# Patient Record
Sex: MALE | Race: BLACK OR AFRICAN AMERICAN | NOT HISPANIC OR LATINO | URBAN - NONMETROPOLITAN AREA
[De-identification: names, ages, dates, MRNs, and addresses within clinical notes are randomized per-mention and may not be internally consistent; named-entity substitution may affect disease eponyms.]

---

## 2020-09-23 ENCOUNTER — LAB OUTSIDE AN ENCOUNTER (OUTPATIENT)
Dept: URBAN - NONMETROPOLITAN AREA CLINIC 2 | Facility: CLINIC | Age: 50
End: 2020-09-23

## 2020-09-23 ENCOUNTER — OFFICE VISIT (OUTPATIENT)
Dept: URBAN - NONMETROPOLITAN AREA CLINIC 2 | Facility: CLINIC | Age: 50
End: 2020-09-23
Payer: MEDICARE

## 2020-09-23 DIAGNOSIS — R12 HEARTBURN: ICD-10-CM

## 2020-09-23 DIAGNOSIS — R68.81 EARLY SATIETY: ICD-10-CM

## 2020-09-23 DIAGNOSIS — R14.0 BLOATING: ICD-10-CM

## 2020-09-23 DIAGNOSIS — N18.6 END STAGE RENAL DISEASE: ICD-10-CM

## 2020-09-23 DIAGNOSIS — K59.01 CONSTIPATION BY DELAYED COLONIC TRANSIT: ICD-10-CM

## 2020-09-23 DIAGNOSIS — Z12.11 COLON CANCER SCREENING: ICD-10-CM

## 2020-09-23 PROCEDURE — 99203 OFFICE O/P NEW LOW 30 MIN: CPT | Performed by: NURSE PRACTITIONER

## 2020-09-23 PROCEDURE — G8427 DOCREV CUR MEDS BY ELIG CLIN: HCPCS | Performed by: NURSE PRACTITIONER

## 2020-09-23 PROCEDURE — G9902 PT SCRN TBCO AND ID AS USER: HCPCS | Performed by: NURSE PRACTITIONER

## 2020-09-23 PROCEDURE — G8420 CALC BMI NORM PARAMETERS: HCPCS | Performed by: NURSE PRACTITIONER

## 2020-09-23 RX ORDER — CALCIUM ACETATE 667 MG
2 TABLETS WITH MEALS TABLET ORAL THREE TIMES A DAY
Status: ACTIVE | COMMUNITY

## 2020-09-23 RX ORDER — ATORVASTATIN CALCIUM 20 MG/1
1 TABLET TABLET, FILM COATED ORAL ONCE A DAY
Status: ACTIVE | COMMUNITY

## 2020-09-23 RX ORDER — CALCITRIOL 0.25 UG/1
1 CAPSULE CAPSULE ORAL ONCE A DAY
Status: ACTIVE | COMMUNITY

## 2020-09-23 RX ORDER — INSULIN GLARGINE 100 [IU]/ML
AS DIRECTED INJECTION, SOLUTION SUBCUTANEOUS
Status: ACTIVE | COMMUNITY

## 2020-09-23 RX ORDER — FAMOTIDINE 20 MG/1
1 TABLET AT BEDTIME AS NEEDED TABLET, FILM COATED ORAL ONCE A DAY
Qty: 30 | OUTPATIENT
Start: 2020-09-23

## 2020-09-23 RX ORDER — INSULIN ASPART 100 [IU]/ML
AS DIRECTED INJECTION, SOLUTION INTRAVENOUS; SUBCUTANEOUS
Status: ACTIVE | COMMUNITY

## 2020-09-23 NOTE — HPI-TODAY'S VISIT:
Angel Wilburn is a 50-year-old male with end-stage renal disease on hemodialysis Tuesday, Thursday, Saturday follows with Dr. Sommers.  He presents today with complaints of epigastric bloating and heartburn symptoms.  He has early satiety.  He has persistent nausea and occasionally vomiting.  No changes in weight.  He is not on antacids.  He has never had EGD.  He does have uncontrolled diabetes on insulin.  No prior diagnosis of gastroparesis. He reports chronic constipation that is mild and his main symptom is incomplete evacuation.  Dr. Sommers recently asked him to start taking baking soda each morning and he thinks this is helping but he continues with intermittent problems.  No rectal bleeding.  He has never had colonoscopy.  He is following with [facility] and will be following up in a couple months to start his renal transplant evaluation.  TG

## 2020-09-28 ENCOUNTER — OFFICE VISIT (OUTPATIENT)
Dept: URBAN - METROPOLITAN AREA MEDICAL CENTER 1 | Facility: MEDICAL CENTER | Age: 50
End: 2020-09-28
Payer: MEDICARE

## 2020-09-28 DIAGNOSIS — K20.8 CORROSIVE ESOPHAGITIS: ICD-10-CM

## 2020-09-28 DIAGNOSIS — K29.30 CHRONIC SUPERFICIAL GASTRITIS: ICD-10-CM

## 2020-09-28 PROCEDURE — 43239 EGD BIOPSY SINGLE/MULTIPLE: CPT | Performed by: INTERNAL MEDICINE

## 2020-09-28 RX ORDER — INSULIN GLARGINE 100 [IU]/ML
AS DIRECTED INJECTION, SOLUTION SUBCUTANEOUS
Status: ACTIVE | COMMUNITY

## 2020-09-28 RX ORDER — CALCITRIOL 0.25 UG/1
1 CAPSULE CAPSULE ORAL ONCE A DAY
Status: ACTIVE | COMMUNITY

## 2020-09-28 RX ORDER — CALCIUM ACETATE 667 MG
2 TABLETS WITH MEALS TABLET ORAL THREE TIMES A DAY
Status: ACTIVE | COMMUNITY

## 2020-09-28 RX ORDER — ATORVASTATIN CALCIUM 20 MG/1
1 TABLET TABLET, FILM COATED ORAL ONCE A DAY
Status: ACTIVE | COMMUNITY

## 2020-09-28 RX ORDER — INSULIN ASPART 100 [IU]/ML
AS DIRECTED INJECTION, SOLUTION INTRAVENOUS; SUBCUTANEOUS
Status: ACTIVE | COMMUNITY

## 2020-09-28 RX ORDER — FAMOTIDINE 20 MG/1
1 TABLET AT BEDTIME AS NEEDED TABLET, FILM COATED ORAL ONCE A DAY
Qty: 30 | Status: ACTIVE | COMMUNITY
Start: 2020-09-23

## 2020-09-29 ENCOUNTER — TELEPHONE ENCOUNTER (OUTPATIENT)
Dept: URBAN - METROPOLITAN AREA CLINIC 92 | Facility: CLINIC | Age: 50
End: 2020-09-29

## 2020-09-29 ENCOUNTER — LAB OUTSIDE AN ENCOUNTER (OUTPATIENT)
Dept: URBAN - METROPOLITAN AREA CLINIC 92 | Facility: CLINIC | Age: 50
End: 2020-09-29

## 2020-10-19 ENCOUNTER — OFFICE VISIT (OUTPATIENT)
Dept: URBAN - NONMETROPOLITAN AREA CLINIC 2 | Facility: CLINIC | Age: 50
End: 2020-10-19
Payer: MEDICARE

## 2020-10-19 ENCOUNTER — LAB OUTSIDE AN ENCOUNTER (OUTPATIENT)
Dept: URBAN - NONMETROPOLITAN AREA CLINIC 2 | Facility: CLINIC | Age: 50
End: 2020-10-19

## 2020-10-19 DIAGNOSIS — R68.81 EARLY SATIETY: ICD-10-CM

## 2020-10-19 DIAGNOSIS — R14.0 BLOATING: ICD-10-CM

## 2020-10-19 DIAGNOSIS — Z12.11 COLON CANCER SCREENING: ICD-10-CM

## 2020-10-19 DIAGNOSIS — K59.01 CONSTIPATION BY DELAYED COLONIC TRANSIT: ICD-10-CM

## 2020-10-19 DIAGNOSIS — R12 HEARTBURN: ICD-10-CM

## 2020-10-19 DIAGNOSIS — N18.6 END STAGE RENAL DISEASE: ICD-10-CM

## 2020-10-19 PROCEDURE — G8484 FLU IMMUNIZE NO ADMIN: HCPCS | Performed by: NURSE PRACTITIONER

## 2020-10-19 PROCEDURE — G8427 DOCREV CUR MEDS BY ELIG CLIN: HCPCS | Performed by: NURSE PRACTITIONER

## 2020-10-19 PROCEDURE — G8417 CALC BMI ABV UP PARAM F/U: HCPCS | Performed by: NURSE PRACTITIONER

## 2020-10-19 PROCEDURE — G9902 PT SCRN TBCO AND ID AS USER: HCPCS | Performed by: NURSE PRACTITIONER

## 2020-10-19 PROCEDURE — 99214 OFFICE O/P EST MOD 30 MIN: CPT | Performed by: NURSE PRACTITIONER

## 2020-10-19 RX ORDER — INSULIN ASPART 100 [IU]/ML
AS DIRECTED INJECTION, SOLUTION INTRAVENOUS; SUBCUTANEOUS
Status: ACTIVE | COMMUNITY

## 2020-10-19 RX ORDER — CALCIUM ACETATE 667 MG
2 TABLETS WITH MEALS TABLET ORAL THREE TIMES A DAY
Status: ACTIVE | COMMUNITY

## 2020-10-19 RX ORDER — POLYETHYLENE GLYCOL 3350, SODIUM SULFATE ANHYDROUS, SODIUM BICARBONATE, SODIUM CHLORIDE, POTASSIUM CHLORIDE 236; 22.74; 6.74; 5.86; 2.97 G/4L; G/4L; G/4L; G/4L; G/4L
AS DIRECTED POWDER, FOR SOLUTION ORAL ONCE
Qty: 1 BOTTLE | Refills: 0 | OUTPATIENT
Start: 2020-10-19 | End: 2020-10-20

## 2020-10-19 RX ORDER — CALCITRIOL 0.25 UG/1
1 CAPSULE CAPSULE ORAL ONCE A DAY
Status: ACTIVE | COMMUNITY

## 2020-10-19 RX ORDER — ATORVASTATIN CALCIUM 20 MG/1
1 TABLET TABLET, FILM COATED ORAL ONCE A DAY
Status: ACTIVE | COMMUNITY

## 2020-10-19 RX ORDER — FAMOTIDINE 20 MG/1
1 TABLET AT BEDTIME AS NEEDED TABLET, FILM COATED ORAL ONCE A DAY
Qty: 30 | Status: ACTIVE | COMMUNITY
Start: 2020-09-23

## 2020-10-19 RX ORDER — INSULIN GLARGINE 100 [IU]/ML
AS DIRECTED INJECTION, SOLUTION SUBCUTANEOUS
Status: ACTIVE | COMMUNITY

## 2020-10-19 RX ORDER — FAMOTIDINE 20 MG/1
1 TABLET AT LUNCH AND 1 TABLET AT  BEDTIME TABLET, FILM COATED ORAL TWICE A DAY
Qty: 180 TABLET | Refills: 3 | OUTPATIENT

## 2020-10-19 NOTE — HPI-TODAY'S VISIT:
9/23/20 Angel Wilburn is a 50-year-old male with end-stage renal disease on hemodialysis Tuesday, Thursday, Saturday follows with Dr. Sommers.  He presents today with complaints of epigastric bloating and heartburn symptoms.  He has early satiety.  He has persistent nausea and occasionally vomiting.  No changes in weight.  He is not on antacids.  He has never had EGD.  He does have uncontrolled diabetes on insulin.  No prior diagnosis of gastroparesis. He reports chronic constipation that is mild and his main symptom is incomplete evacuation.  Dr. Sommers recently asked him to start taking baking soda each morning and he thinks this is helping but he continues with intermittent problems.  No rectal bleeding.  He has never had colonoscopy.  He is following with [facility] and will be following up in a couple months to start his renal transplant evaluation.  TG 10/19/20 Mr. Wilburn presents for follow-up after EGD.  He had his procedure 9/28/2020 at the hospital with normal mucosa of the esophagus, stomach, and duodenum path consistent with chronic gastritis and esophagitis, no H. pylori, EOE, or Goodwin's. He feels like his symptoms are better on the famotidine however he is taking it every 2 or 3 days and not nightly.  He continues with early satiety, bloating, generalized epigastric discomfort.  He has nausea after dialysis but no vomiting. He has never had colonoscopy.  No rectal bleeding or melena.  No change in bowel habits.  He does have anemia from chronic disease as he has end-stage renal failure on hemodialysis.  He is planning to start renal transplant evaluation within the next few months per his report.  TG

## 2020-10-26 ENCOUNTER — OFFICE VISIT (OUTPATIENT)
Dept: URBAN - METROPOLITAN AREA MEDICAL CENTER 1 | Facility: MEDICAL CENTER | Age: 50
End: 2020-10-26
Payer: MEDICARE

## 2020-10-26 DIAGNOSIS — K62.1 DYSPLASTIC POLYP OF RECTUM: ICD-10-CM

## 2020-10-26 DIAGNOSIS — Z12.11 COLON CANCER SCREENING: ICD-10-CM

## 2020-10-26 DIAGNOSIS — K63.5 BENIGN COLON POLYP: ICD-10-CM

## 2020-10-26 PROCEDURE — G9935 CANC NOT DETECTD DURING SRCN: HCPCS | Performed by: INTERNAL MEDICINE

## 2020-10-26 PROCEDURE — 45380 COLONOSCOPY AND BIOPSY: CPT | Performed by: INTERNAL MEDICINE

## 2020-11-16 ENCOUNTER — OFFICE VISIT (OUTPATIENT)
Dept: URBAN - NONMETROPOLITAN AREA CLINIC 2 | Facility: CLINIC | Age: 50
End: 2020-11-16
Payer: MEDICARE

## 2020-11-16 ENCOUNTER — LAB OUTSIDE AN ENCOUNTER (OUTPATIENT)
Dept: URBAN - NONMETROPOLITAN AREA CLINIC 2 | Facility: CLINIC | Age: 50
End: 2020-11-16

## 2020-11-16 DIAGNOSIS — N18.6 END STAGE RENAL DISEASE: ICD-10-CM

## 2020-11-16 DIAGNOSIS — R68.81 EARLY SATIETY: ICD-10-CM

## 2020-11-16 DIAGNOSIS — R12 HEARTBURN: ICD-10-CM

## 2020-11-16 DIAGNOSIS — R14.0 BLOATING: ICD-10-CM

## 2020-11-16 DIAGNOSIS — Z12.11 COLON CANCER SCREENING: ICD-10-CM

## 2020-11-16 DIAGNOSIS — K59.01 CONSTIPATION BY DELAYED COLONIC TRANSIT: ICD-10-CM

## 2020-11-16 PROCEDURE — 99213 OFFICE O/P EST LOW 20 MIN: CPT | Performed by: NURSE PRACTITIONER

## 2020-11-16 PROCEDURE — G8483 FLU IMM NO ADMIN DOC REA: HCPCS | Performed by: NURSE PRACTITIONER

## 2020-11-16 PROCEDURE — G9902 PT SCRN TBCO AND ID AS USER: HCPCS | Performed by: NURSE PRACTITIONER

## 2020-11-16 PROCEDURE — G8420 CALC BMI NORM PARAMETERS: HCPCS | Performed by: NURSE PRACTITIONER

## 2020-11-16 PROCEDURE — G8427 DOCREV CUR MEDS BY ELIG CLIN: HCPCS | Performed by: NURSE PRACTITIONER

## 2020-11-16 RX ORDER — CALCITRIOL 0.25 UG/1
1 CAPSULE CAPSULE ORAL ONCE A DAY
Status: ACTIVE | COMMUNITY

## 2020-11-16 RX ORDER — FAMOTIDINE 20 MG/1
1 TABLET AT LUNCH AND 1 TABLET AT  BEDTIME TABLET, FILM COATED ORAL TWICE A DAY
Qty: 180 TABLET | Refills: 3 | Status: ACTIVE | COMMUNITY

## 2020-11-16 RX ORDER — ATORVASTATIN CALCIUM 20 MG/1
1 TABLET TABLET, FILM COATED ORAL ONCE A DAY
Status: ACTIVE | COMMUNITY

## 2020-11-16 RX ORDER — INSULIN GLARGINE 100 [IU]/ML
AS DIRECTED INJECTION, SOLUTION SUBCUTANEOUS
Status: ACTIVE | COMMUNITY

## 2020-11-16 RX ORDER — CALCIUM ACETATE 667 MG
2 TABLETS WITH MEALS TABLET ORAL THREE TIMES A DAY
Status: ACTIVE | COMMUNITY

## 2020-11-16 RX ORDER — INSULIN ASPART 100 [IU]/ML
AS DIRECTED INJECTION, SOLUTION INTRAVENOUS; SUBCUTANEOUS
Status: ACTIVE | COMMUNITY

## 2020-11-16 RX ORDER — FAMOTIDINE 20 MG/1
1 TABLET AT LUNCH AND 1 TABLET AT  BEDTIME TABLET, FILM COATED ORAL TWICE A DAY
Qty: 180 TABLET | Refills: 3 | OUTPATIENT

## 2020-11-16 NOTE — PHYSICAL EXAM NECK/THYROID:
normal appearance , without tenderness upon palpation , no deformities , trachea midline , Thyroid normal size , no thyroid nodules , no masses , no JVD , thyroid nontender pt s/p ileostomy creation 12/10

## 2020-11-16 NOTE — HPI-TODAY'S VISIT:
9/23/20 Angel Wilburn is a 50-year-old male with end-stage renal disease on hemodialysis Tuesday, Thursday, Saturday follows with Dr. Sommers.  He presents today with complaints of epigastric bloating and heartburn symptoms.  He has early satiety.  He has persistent nausea and occasionally vomiting.  No changes in weight.  He is not on antacids.  He has never had EGD.  He does have uncontrolled diabetes on insulin.  No prior diagnosis of gastroparesis. He reports chronic constipation that is mild and his main symptom is incomplete evacuation.  Dr. Sommers recently asked him to start taking baking soda each morning and he thinks this is helping but he continues with intermittent problems.  No rectal bleeding.  He has never had colonoscopy.  He is following with [facility] and will be following up in a couple months to start his renal transplant evaluation.  TG 10/19/20 Mr. Wilburn presents for follow-up after EGD.  He had his procedure 9/28/2020 at the hospital with normal mucosa of the esophagus, stomach, and duodenum path consistent with chronic gastritis and esophagitis, no H. pylori, EOE, or Goodwin's. He feels like his symptoms are better on the famotidine however he is taking it every 2 or 3 days and not nightly.  He continues with early satiety, bloating, generalized epigastric discomfort.  He has nausea after dialysis but no vomiting. He has never had colonoscopy.  No rectal bleeding or melena.  No change in bowel habits.  He does have anemia from chronic disease as he has end-stage renal failure on hemodialysis.  He is planning to start renal transplant evaluation within the next few months per his report.  TG  11/16/20 Angel presents for follow up after colonoscopy 10/26/20 with a polyp in the rectum and simgoid, noted to have sigmoid spasm, mild diverticulosis of the ascenidng and sigmoid colon, normal TI. Path with HP polyps, to repeat in 10 years.  Bowel movements are about the same. He did not start the bowel regimen. He has incomplete evacuation, may be related to spasm. Go ahead with the bowel regimen.  His heartburn is better but he continues on the famotidine only as needed and is not taking it scheduled. He has nausea most mornings and vomits a few times week. No nausea/vomiting throughout the rest of the day and no issues after eating. His appetite and weight are stable. He did not do the GES. TG

## 2021-02-15 ENCOUNTER — OFFICE VISIT (OUTPATIENT)
Dept: URBAN - NONMETROPOLITAN AREA CLINIC 2 | Facility: CLINIC | Age: 51
End: 2021-02-15

## 2021-02-15 RX ORDER — ATORVASTATIN CALCIUM 20 MG/1
1 TABLET TABLET, FILM COATED ORAL ONCE A DAY
Status: ACTIVE | COMMUNITY

## 2021-02-15 RX ORDER — CALCITRIOL 0.25 UG/1
1 CAPSULE CAPSULE ORAL ONCE A DAY
Status: ACTIVE | COMMUNITY

## 2021-02-15 RX ORDER — INSULIN GLARGINE 100 [IU]/ML
AS DIRECTED INJECTION, SOLUTION SUBCUTANEOUS
Status: ACTIVE | COMMUNITY

## 2021-02-15 RX ORDER — CALCIUM ACETATE 667 MG
2 TABLETS WITH MEALS TABLET ORAL THREE TIMES A DAY
Status: ACTIVE | COMMUNITY

## 2021-02-15 RX ORDER — FAMOTIDINE 20 MG/1
1 TABLET AT LUNCH AND 1 TABLET AT  BEDTIME TABLET, FILM COATED ORAL TWICE A DAY
Qty: 180 TABLET | Refills: 3 | Status: ACTIVE | COMMUNITY

## 2021-02-15 RX ORDER — INSULIN ASPART 100 [IU]/ML
AS DIRECTED INJECTION, SOLUTION INTRAVENOUS; SUBCUTANEOUS
Status: ACTIVE | COMMUNITY

## 2021-03-01 ENCOUNTER — OFFICE VISIT (OUTPATIENT)
Dept: URBAN - NONMETROPOLITAN AREA CLINIC 2 | Facility: CLINIC | Age: 51
End: 2021-03-01
Payer: MEDICARE

## 2021-03-01 DIAGNOSIS — Z12.11 COLON CANCER SCREENING: ICD-10-CM

## 2021-03-01 DIAGNOSIS — N18.6 END STAGE RENAL DISEASE: ICD-10-CM

## 2021-03-01 DIAGNOSIS — R12 HEARTBURN: ICD-10-CM

## 2021-03-01 DIAGNOSIS — K31.84 GASTROPARESIS: ICD-10-CM

## 2021-03-01 DIAGNOSIS — K59.01 CONSTIPATION BY DELAYED COLONIC TRANSIT: ICD-10-CM

## 2021-03-01 PROCEDURE — 99213 OFFICE O/P EST LOW 20 MIN: CPT | Performed by: NURSE PRACTITIONER

## 2021-03-01 RX ORDER — INSULIN ASPART 100 [IU]/ML
AS DIRECTED INJECTION, SOLUTION INTRAVENOUS; SUBCUTANEOUS
Status: ACTIVE | COMMUNITY

## 2021-03-01 RX ORDER — FAMOTIDINE 20 MG/1
1 TABLET AT LUNCH AND 1 TABLET AT  BEDTIME TABLET, FILM COATED ORAL TWICE A DAY
Qty: 180 TABLET | Refills: 3 | Status: ACTIVE | COMMUNITY

## 2021-03-01 RX ORDER — ATORVASTATIN CALCIUM 20 MG/1
1 TABLET TABLET, FILM COATED ORAL ONCE A DAY
Status: ACTIVE | COMMUNITY

## 2021-03-01 RX ORDER — INSULIN GLARGINE 100 [IU]/ML
AS DIRECTED INJECTION, SOLUTION SUBCUTANEOUS
Status: ACTIVE | COMMUNITY

## 2021-03-01 RX ORDER — FAMOTIDINE 20 MG/1
1 TABLET AT LUNCH AND 1 TABLET AT  BEDTIME TABLET, FILM COATED ORAL TWICE A DAY
OUTPATIENT

## 2021-03-01 RX ORDER — CALCITRIOL 0.25 UG/1
1 CAPSULE CAPSULE ORAL ONCE A DAY
Status: ACTIVE | COMMUNITY

## 2021-03-01 RX ORDER — CALCIUM ACETATE 667 MG
2 TABLETS WITH MEALS TABLET ORAL THREE TIMES A DAY
Status: ACTIVE | COMMUNITY

## 2021-03-01 NOTE — HPI-TODAY'S VISIT:
9/23/20 Angel Wilburn is a 50-year-old male with end-stage renal disease on hemodialysis Tuesday, Thursday, Saturday follows with Dr. Sommers.  He presents today with complaints of epigastric bloating and heartburn symptoms.  He has early satiety.  He has persistent nausea and occasionally vomiting.  No changes in weight.  He is not on antacids.  He has never had EGD.  He does have uncontrolled diabetes on insulin.  No prior diagnosis of gastroparesis. He reports chronic constipation that is mild and his main symptom is incomplete evacuation.  Dr. Sommers recently asked him to start taking baking soda each morning and he thinks this is helping but he continues with intermittent problems.  No rectal bleeding.  He has never had colonoscopy.  He is following with [facility] and will be following up in a couple months to start his renal transplant evaluation.  TG 10/19/20 Mr. Wilburn presents for follow-up after EGD.  He had his procedure 9/28/2020 at the hospital with normal mucosa of the esophagus, stomach, and duodenum path consistent with chronic gastritis and esophagitis, no H. pylori, EOE, or Goodwin's. He feels like his symptoms are better on the famotidine however he is taking it every 2 or 3 days and not nightly.  He continues with early satiety, bloating, generalized epigastric discomfort.  He has nausea after dialysis but no vomiting. He has never had colonoscopy.  No rectal bleeding or melena.  No change in bowel habits.  He does have anemia from chronic disease as he has end-stage renal failure on hemodialysis.  He is planning to start renal transplant evaluation within the next few months per his report.  TG  11/16/20 Angel presents for follow up after colonoscopy 10/26/20 with a polyp in the rectum and simgoid, noted to have sigmoid spasm, mild diverticulosis of the ascenidng and sigmoid colon, normal TI. Path with HP polyps, to repeat in 10 years.  Bowel movements are about the same. He did not start the bowel regimen. He has incomplete evacuation, may be related to spasm. Go ahead with the bowel regimen.  His heartburn is better but he continues on the famotidine only as needed and is not taking it scheduled. He has nausea most mornings and vomits a few times week. No nausea/vomiting throughout the rest of the day and no issues after eating. His appetite and weight are stable. He did not do the GES. TG  3/1/21 Angel presents for follow up for HB and early satiety. His GES showed mild delay of 57 minutes. He reports his heartburn is better since he started taking the famotidine daily. He is not taking it twice daily. He does have some  breakthrough symptos occasionally but for the  most part its well controlled. His early satiety seems to be better since starting the famotidine as well. Discussed this is likely secondary to uncontrolled diabetes and he reports his blood sugars have been more controlled recently. He has noticed more anxiety recently and plans to talk to his PCP about this. His weight is stable. He has no new complaints. TG

## 2021-06-23 ENCOUNTER — TELEPHONE ENCOUNTER (OUTPATIENT)
Dept: URBAN - METROPOLITAN AREA CLINIC 23 | Facility: CLINIC | Age: 51
End: 2021-06-23

## 2021-06-25 ENCOUNTER — OFFICE VISIT (OUTPATIENT)
Dept: URBAN - NONMETROPOLITAN AREA CLINIC 2 | Facility: CLINIC | Age: 51
End: 2021-06-25
Payer: MEDICARE

## 2021-06-25 ENCOUNTER — WEB ENCOUNTER (OUTPATIENT)
Dept: URBAN - NONMETROPOLITAN AREA CLINIC 2 | Facility: CLINIC | Age: 51
End: 2021-06-25

## 2021-06-25 VITALS
BODY MASS INDEX: 23.55 KG/M2 | DIASTOLIC BLOOD PRESSURE: 79 MMHG | HEIGHT: 72 IN | TEMPERATURE: 97.3 F | WEIGHT: 173.9 LBS | SYSTOLIC BLOOD PRESSURE: 138 MMHG | HEART RATE: 80 BPM

## 2021-06-25 DIAGNOSIS — Z12.11 COLON CANCER SCREENING: ICD-10-CM

## 2021-06-25 DIAGNOSIS — R19.5 DARK STOOLS: ICD-10-CM

## 2021-06-25 DIAGNOSIS — R12 HEARTBURN: ICD-10-CM

## 2021-06-25 DIAGNOSIS — N18.6 END STAGE RENAL DISEASE: ICD-10-CM

## 2021-06-25 DIAGNOSIS — K59.01 CONSTIPATION BY DELAYED COLONIC TRANSIT: ICD-10-CM

## 2021-06-25 DIAGNOSIS — K31.84 GASTROPARESIS: ICD-10-CM

## 2021-06-25 PROCEDURE — 99213 OFFICE O/P EST LOW 20 MIN: CPT | Performed by: INTERNAL MEDICINE

## 2021-06-25 RX ORDER — ATORVASTATIN CALCIUM 20 MG/1
1 TABLET TABLET, FILM COATED ORAL ONCE A DAY
Status: ACTIVE | COMMUNITY

## 2021-06-25 RX ORDER — INSULIN GLARGINE 100 [IU]/ML
AS DIRECTED INJECTION, SOLUTION SUBCUTANEOUS
Status: ACTIVE | COMMUNITY

## 2021-06-25 RX ORDER — FAMOTIDINE 20 MG/1
1 TABLET AT LUNCH AND 1 TABLET AT  BEDTIME TABLET, FILM COATED ORAL TWICE A DAY
Status: ACTIVE | COMMUNITY

## 2021-06-25 RX ORDER — CALCITRIOL 0.25 UG/1
1 CAPSULE CAPSULE ORAL ONCE A DAY
Status: ACTIVE | COMMUNITY

## 2021-06-25 RX ORDER — INSULIN ASPART 100 [IU]/ML
AS DIRECTED INJECTION, SOLUTION INTRAVENOUS; SUBCUTANEOUS
Status: ACTIVE | COMMUNITY

## 2021-06-25 RX ORDER — CALCIUM ACETATE 667 MG
2 TABLETS WITH MEALS TABLET ORAL THREE TIMES A DAY
Status: ACTIVE | COMMUNITY

## 2021-06-25 RX ORDER — FAMOTIDINE 20 MG/1
1 TABLET AT LUNCH AND 1 TABLET AT  BEDTIME TABLET, FILM COATED ORAL TWICE A DAY
OUTPATIENT

## 2021-06-25 NOTE — HPI-TODAY'S VISIT:
9/23/20 Angel Wilburn is a 50-year-old male with end-stage renal disease on hemodialysis Tuesday, Thursday, Saturday follows with Dr. Sommers.  He presents today with complaints of epigastric bloating and heartburn symptoms.  He has early satiety.  He has persistent nausea and occasionally vomiting.  No changes in weight.  He is not on antacids.  He has never had EGD.  He does have uncontrolled diabetes on insulin.  No prior diagnosis of gastroparesis. He reports chronic constipation that is mild and his main symptom is incomplete evacuation.  Dr. Sommers recently asked him to start taking baking soda each morning and he thinks this is helping but he continues with intermittent problems.  No rectal bleeding.  He has never had colonoscopy.  He is following with [facility] and will be following up in a couple months to start his renal transplant evaluation.  TG  10/19/20 Mr. Wilburn presents for follow-up after EGD.  He had his procedure 9/28/2020 at the hospital with normal mucosa of the esophagus, stomach, and duodenum path consistent with chronic gastritis and esophagitis, no H. pylori, EOE, or Goodwin's. He feels like his symptoms are better on the famotidine however he is taking it every 2 or 3 days and not nightly.  He continues with early satiety, bloating, generalized epigastric discomfort.  He has nausea after dialysis but no vomiting. He has never had colonoscopy.  No rectal bleeding or melena.  No change in bowel habits.  He does have anemia from chronic disease as he has end-stage renal failure on hemodialysis.  He is planning to start renal transplant evaluation within the next few months per his report.  TG  11/16/20 Angel presents for follow up after colonoscopy 10/26/20 with a polyp in the rectum and simgoid, noted to have sigmoid spasm, mild diverticulosis of the ascenidng and sigmoid colon, normal TI. Path with HP polyps, to repeat in 10 years.  Bowel movements are about the same. He did not start the bowel regimen. He has incomplete evacuation, may be related to spasm. Go ahead with the bowel regimen.  His heartburn is better but he continues on the famotidine only as needed and is not taking it scheduled. He has nausea most mornings and vomits a few times week. No nausea/vomiting throughout the rest of the day and no issues after eating. His appetite and weight are stable. He did not do the GES. TG  3/1/21 Angel presents for follow up for HB and early satiety. His GES showed mild delay of 57 minutes. He reports his heartburn is better since he started taking the famotidine daily. He is not taking it twice daily. He does have some  breakthrough symptos occasionally but for the  most part its well controlled. His early satiety seems to be better since starting the famotidine as well. Discussed this is likely secondary to uncontrolled diabetes and he reports his blood sugars have been more controlled recently. He has noticed more anxiety recently and plans to talk to his PCP about this. His weight is stable. He has no new complaints. TG  6/25/21 Angel presents for follow up today with reports of rectal bleeding. Stools are dark black/dark gray looking and were hemoccult positive with Dr. Daniele Davenport, his new PCP. This started on Monday. He has 1-2 stools daily. Stools are soft or formed but not diarrhea. He reports some dizziness and lightheadedness that he associates with anxiety that seems to bother him more while trying to sleep at night. He was started on Zoloft a few days ago. He has palpitations and heart racing. He went to the ER for palpitations and was told everything was okay. This was at [facility] ER about a week ago. He had dialysis yesterday and was okay throughout.  Denies abdominal pain. His nausea/vomiting is at baseline. He does have hx of gastroparesis. He continues with heartburn. He stopped the famotidine. TG

## 2021-07-02 ENCOUNTER — OFFICE VISIT (OUTPATIENT)
Dept: URBAN - NONMETROPOLITAN AREA CLINIC 2 | Facility: CLINIC | Age: 51
End: 2021-07-02
Payer: MEDICARE

## 2021-07-02 VITALS
HEART RATE: 95 BPM | TEMPERATURE: 98.6 F | WEIGHT: 176 LBS | SYSTOLIC BLOOD PRESSURE: 149 MMHG | DIASTOLIC BLOOD PRESSURE: 77 MMHG | HEIGHT: 72 IN | BODY MASS INDEX: 23.84 KG/M2

## 2021-07-02 DIAGNOSIS — R12 HEARTBURN: ICD-10-CM

## 2021-07-02 DIAGNOSIS — K59.01 CONSTIPATION BY DELAYED COLONIC TRANSIT: ICD-10-CM

## 2021-07-02 DIAGNOSIS — N18.6 END STAGE RENAL DISEASE: ICD-10-CM

## 2021-07-02 DIAGNOSIS — K31.84 GASTROPARESIS: ICD-10-CM

## 2021-07-02 DIAGNOSIS — D64.9 ANEMIA, UNSPECIFIED TYPE: ICD-10-CM

## 2021-07-02 DIAGNOSIS — R19.5 DARK STOOLS: ICD-10-CM

## 2021-07-02 DIAGNOSIS — Z12.11 COLON CANCER SCREENING: ICD-10-CM

## 2021-07-02 PROCEDURE — 99214 OFFICE O/P EST MOD 30 MIN: CPT | Performed by: INTERNAL MEDICINE

## 2021-07-02 RX ORDER — INSULIN ASPART 100 [IU]/ML
AS DIRECTED INJECTION, SOLUTION INTRAVENOUS; SUBCUTANEOUS
Status: ACTIVE | COMMUNITY

## 2021-07-02 RX ORDER — INSULIN GLARGINE 100 [IU]/ML
AS DIRECTED INJECTION, SOLUTION SUBCUTANEOUS
Status: ACTIVE | COMMUNITY

## 2021-07-02 RX ORDER — HYDROXYZINE HYDROCHLORIDE 25 MG/1
TAKE 1 TABLET BY MOUTH EVERY 8 HOURS AS NEEDED FOR ANXIETY TABLET ORAL
Qty: 60 | Refills: 0 | Status: ACTIVE | COMMUNITY

## 2021-07-02 RX ORDER — PANTOPRAZOLE SODIUM 20 MG/1
1 TABLET TABLET, DELAYED RELEASE ORAL ONCE A DAY
Qty: 90 TABLET | Refills: 3 | OUTPATIENT

## 2021-07-02 RX ORDER — ALPRAZOLAM 0.5 MG/1
TAKE 1 TABLET BY MOUTH TWICE DAILY AS NEEDED TABLET ORAL
Qty: 60 | Refills: 0 | Status: ACTIVE | COMMUNITY

## 2021-07-02 RX ORDER — CALCIUM ACETATE 667 MG
2 TABLETS WITH MEALS TABLET ORAL THREE TIMES A DAY
Status: ON HOLD | COMMUNITY

## 2021-07-02 RX ORDER — SUCROFERRIC OXYHYDROXIDE 500 MG/1
TABLET, CHEWABLE ORAL
Qty: 120 | Status: ACTIVE | COMMUNITY

## 2021-07-02 RX ORDER — FAMOTIDINE 20 MG/1
1 TABLET AT LUNCH AND 1 TABLET AT  BEDTIME TABLET, FILM COATED ORAL TWICE A DAY
Status: ACTIVE | COMMUNITY

## 2021-07-02 RX ORDER — SERTRALINE HYDROCHLORIDE 50 MG/1
TAKE 1 TABLET BY MOUTH ONCE DAILY TABLET ORAL
Qty: 30 | Refills: 0 | Status: ACTIVE | COMMUNITY

## 2021-07-02 RX ORDER — NIFEDIPINE 60 MG/1
TABLET, FILM COATED, EXTENDED RELEASE ORAL
Qty: 90 | Status: ACTIVE | COMMUNITY

## 2021-07-02 RX ORDER — CALCITRIOL 0.25 UG/1
1 CAPSULE CAPSULE ORAL ONCE A DAY
Status: ACTIVE | COMMUNITY

## 2021-07-02 RX ORDER — ATORVASTATIN CALCIUM 20 MG/1
1 TABLET TABLET, FILM COATED ORAL ONCE A DAY
Status: ACTIVE | COMMUNITY

## 2021-07-02 NOTE — HPI-TODAY'S VISIT:
9/23/20 Angel Wilburn is a 50-year-old male with end-stage renal disease on hemodialysis Tuesday, Thursday, Saturday follows with Dr. Sommers.  He presents today with complaints of epigastric bloating and heartburn symptoms.  He has early satiety.  He has persistent nausea and occasionally vomiting.  No changes in weight.  He is not on antacids.  He has never had EGD.  He does have uncontrolled diabetes on insulin.  No prior diagnosis of gastroparesis. He reports chronic constipation that is mild and his main symptom is incomplete evacuation.  Dr. Sommers recently asked him to start taking baking soda each morning and he thinks this is helping but he continues with intermittent problems.  No rectal bleeding.  He has never had colonoscopy.  He is following with [facility] and will be following up in a couple months to start his renal transplant evaluation.  TG  10/19/20 Mr. Wilburn presents for follow-up after EGD.  He had his procedure 9/28/2020 at the hospital with normal mucosa of the esophagus, stomach, and duodenum path consistent with chronic gastritis and esophagitis, no H. pylori, EOE, or Goodwin's. He feels like his symptoms are better on the famotidine however he is taking it every 2 or 3 days and not nightly.  He continues with early satiety, bloating, generalized epigastric discomfort.  He has nausea after dialysis but no vomiting. He has never had colonoscopy.  No rectal bleeding or melena.  No change in bowel habits.  He does have anemia from chronic disease as he has end-stage renal failure on hemodialysis.  He is planning to start renal transplant evaluation within the next few months per his report.  TG  11/16/20 Angel presents for follow up after colonoscopy 10/26/20 with a polyp in the rectum and simgoid, noted to have sigmoid spasm, mild diverticulosis of the ascenidng and sigmoid colon, normal TI. Path with HP polyps, to repeat in 10 years.  Bowel movements are about the same. He did not start the bowel regimen. He has incomplete evacuation, may be related to spasm. Go ahead with the bowel regimen.  His heartburn is better but he continues on the famotidine only as needed and is not taking it scheduled. He has nausea most mornings and vomits a few times week. No nausea/vomiting throughout the rest of the day and no issues after eating. His appetite and weight are stable. He did not do the GES. TG  3/1/21 Angel presents for follow up for HB and early satiety. His GES showed mild delay of 57 minutes. He reports his heartburn is better since he started taking the famotidine daily. He is not taking it twice daily. He does have some  breakthrough symptos occasionally but for the  most part its well controlled. His early satiety seems to be better since starting the famotidine as well. Discussed this is likely secondary to uncontrolled diabetes and he reports his blood sugars have been more controlled recently. He has noticed more anxiety recently and plans to talk to his PCP about this. His weight is stable. He has no new complaints. TG  6/25/21 Angel presents for follow up today with reports of rectal bleeding. Stools are dark black/dark gray looking and were hemoccult positive with Dr. Daniele Davenport, his new PCP. This started on Monday. He has 1-2 stools daily. Stools are soft or formed but not diarrhea. He reports some dizziness and lightheadedness that he associates with anxiety that seems to bother him more while trying to sleep at night. He was started on Zoloft a few days ago. He has palpitations and heart racing. He went to the ER for palpitations and was told everything was okay. This was at [facility] ER about a week ago. He had dialysis yesterday and was okay throughout.  Denies abdominal pain. His nausea/vomiting is at baseline. He does have hx of gastroparesis. He continues with heartburn. He stopped the famotidine. TG  7/2/21 Angel presents for follow up. He did go to the ER last week for the new black stools. His hemoglobin was stable at 11.8 and his CT showed no acute findings. He was started on lansoprazole 30mg once daily and to follow up in clinic. He continues to have 2-3 black stools that are soft daily. My medical assistant, Samantha, was able to get updated medication today and he was started on Velphoro about the same time that we began with the black stools. Discussed this medication can cause dark stools.  He reports his heartburn is better on the lansoprazole. He was not taking the famotidine regularly prior to this. TG

## 2021-07-03 LAB
HEMATOCRIT: 38.3
HEMOGLOBIN: 12.7
MCH: 28.7
MCHC: 33.2
MCV: 87
NRBC: (no result)
PLATELETS: 240
RBC: 4.42
RDW: 15
WBC: 9.1

## 2021-07-08 ENCOUNTER — OFFICE VISIT (OUTPATIENT)
Dept: URBAN - NONMETROPOLITAN AREA CLINIC 2 | Facility: CLINIC | Age: 51
End: 2021-07-08

## 2021-09-01 ENCOUNTER — OFFICE VISIT (OUTPATIENT)
Dept: URBAN - NONMETROPOLITAN AREA CLINIC 2 | Facility: CLINIC | Age: 51
End: 2021-09-01

## 2021-10-01 ENCOUNTER — OFFICE VISIT (OUTPATIENT)
Dept: URBAN - NONMETROPOLITAN AREA CLINIC 2 | Facility: CLINIC | Age: 51
End: 2021-10-01
Payer: MEDICARE

## 2021-10-01 ENCOUNTER — LAB OUTSIDE AN ENCOUNTER (OUTPATIENT)
Dept: URBAN - NONMETROPOLITAN AREA CLINIC 2 | Facility: CLINIC | Age: 51
End: 2021-10-01

## 2021-10-01 DIAGNOSIS — N18.6 END STAGE RENAL DISEASE: ICD-10-CM

## 2021-10-01 DIAGNOSIS — K31.84 GASTROPARESIS: ICD-10-CM

## 2021-10-01 DIAGNOSIS — K59.01 CONSTIPATION BY DELAYED COLONIC TRANSIT: ICD-10-CM

## 2021-10-01 DIAGNOSIS — R12 HEARTBURN: ICD-10-CM

## 2021-10-01 DIAGNOSIS — Z12.11 COLON CANCER SCREENING: ICD-10-CM

## 2021-10-01 DIAGNOSIS — D64.9 ANEMIA, UNSPECIFIED TYPE: ICD-10-CM

## 2021-10-01 DIAGNOSIS — R13.19 ESOPHAGEAL DYSPHAGIA: ICD-10-CM

## 2021-10-01 PROCEDURE — 99214 OFFICE O/P EST MOD 30 MIN: CPT | Performed by: NURSE PRACTITIONER

## 2021-10-01 RX ORDER — HYDROXYZINE HYDROCHLORIDE 25 MG/1
TAKE 1 TABLET BY MOUTH EVERY 8 HOURS AS NEEDED FOR ANXIETY TABLET ORAL
Qty: 60 | Refills: 0 | Status: ON HOLD | COMMUNITY

## 2021-10-01 RX ORDER — PANTOPRAZOLE SODIUM 20 MG/1
1 TABLET TABLET, DELAYED RELEASE ORAL ONCE A DAY
Qty: 90 TABLET | Refills: 3 | Status: ACTIVE | COMMUNITY

## 2021-10-01 RX ORDER — SERTRALINE HYDROCHLORIDE 50 MG/1
TAKE 1 TABLET BY MOUTH ONCE DAILY TABLET ORAL
Qty: 30 | Refills: 0 | Status: ON HOLD | COMMUNITY

## 2021-10-01 RX ORDER — PANTOPRAZOLE SODIUM 20 MG/1
1 TABLET TABLET, DELAYED RELEASE ORAL ONCE A DAY
OUTPATIENT

## 2021-10-01 RX ORDER — ALPRAZOLAM 0.5 MG/1
TAKE 1 TABLET BY MOUTH TWICE DAILY AS NEEDED TABLET ORAL
Qty: 60 | Refills: 0 | Status: ACTIVE | COMMUNITY

## 2021-10-01 RX ORDER — FAMOTIDINE 20 MG/1
1 TABLET AT LUNCH AND 1 TABLET AT  BEDTIME TABLET, FILM COATED ORAL TWICE A DAY
Status: ON HOLD | COMMUNITY

## 2021-10-01 RX ORDER — CALCITRIOL 0.25 UG/1
1 CAPSULE CAPSULE ORAL ONCE A DAY
Status: ACTIVE | COMMUNITY

## 2021-10-01 RX ORDER — CALCIUM ACETATE 667 MG
2 TABLETS WITH MEALS TABLET ORAL THREE TIMES A DAY
Status: ON HOLD | COMMUNITY

## 2021-10-01 RX ORDER — INSULIN GLARGINE 100 [IU]/ML
AS DIRECTED INJECTION, SOLUTION SUBCUTANEOUS
Status: ACTIVE | COMMUNITY

## 2021-10-01 RX ORDER — BUPROPION HYDROCHLORIDE 100 MG/1
1 TABLET TABLET ORAL TWICE A DAY
Status: ACTIVE | COMMUNITY

## 2021-10-01 RX ORDER — INSULIN ASPART 100 [IU]/ML
AS DIRECTED INJECTION, SOLUTION INTRAVENOUS; SUBCUTANEOUS
Status: ACTIVE | COMMUNITY

## 2021-10-01 RX ORDER — ATORVASTATIN CALCIUM 20 MG/1
1 TABLET TABLET, FILM COATED ORAL ONCE A DAY
Status: ACTIVE | COMMUNITY

## 2021-10-01 RX ORDER — SUCROFERRIC OXYHYDROXIDE 500 MG/1
TABLET, CHEWABLE ORAL
Qty: 120 | Status: ACTIVE | COMMUNITY

## 2021-10-01 RX ORDER — NIFEDIPINE 60 MG/1
TABLET, FILM COATED, EXTENDED RELEASE ORAL
Qty: 90 | Status: ACTIVE | COMMUNITY

## 2021-10-01 RX ORDER — DULAGLUTIDE 1.5 MG/.5ML
AS DIRECTED INJECTION, SOLUTION SUBCUTANEOUS
Status: ACTIVE | COMMUNITY

## 2021-10-01 NOTE — HPI-TODAY'S VISIT:
9/23/20 Angel Wilburn is a 50-year-old male with end-stage renal disease on hemodialysis Tuesday, Thursday, Saturday follows with Dr. Sommers.  He presents today with complaints of epigastric bloating and heartburn symptoms.  He has early satiety.  He has persistent nausea and occasionally vomiting.  No changes in weight.  He is not on antacids.  He has never had EGD.  He does have uncontrolled diabetes on insulin.  No prior diagnosis of gastroparesis. He reports chronic constipation that is mild and his main symptom is incomplete evacuation.  Dr. Sommers recently asked him to start taking baking soda each morning and he thinks this is helping but he continues with intermittent problems.  No rectal bleeding.  He has never had colonoscopy.  He is following with [facility] and will be following up in a couple months to start his renal transplant evaluation.  TG  10/19/20 Mr. Wilburn presents for follow-up after EGD.  He had his procedure 9/28/2020 at the hospital with normal mucosa of the esophagus, stomach, and duodenum path consistent with chronic gastritis and esophagitis, no H. pylori, EOE, or Goodwin's. He feels like his symptoms are better on the famotidine however he is taking it every 2 or 3 days and not nightly.  He continues with early satiety, bloating, generalized epigastric discomfort.  He has nausea after dialysis but no vomiting. He has never had colonoscopy.  No rectal bleeding or melena.  No change in bowel habits.  He does have anemia from chronic disease as he has end-stage renal failure on hemodialysis.  He is planning to start renal transplant evaluation within the next few months per his report.  TG  11/16/20 Angel presents for follow up after colonoscopy 10/26/20 with a polyp in the rectum and simgoid, noted to have sigmoid spasm, mild diverticulosis of the ascenidng and sigmoid colon, normal TI. Path with HP polyps, to repeat in 10 years.  Bowel movements are about the same. He did not start the bowel regimen. He has incomplete evacuation, may be related to spasm. Go ahead with the bowel regimen.  His heartburn is better but he continues on the famotidine only as needed and is not taking it scheduled. He has nausea most mornings and vomits a few times week. No nausea/vomiting throughout the rest of the day and no issues after eating. His appetite and weight are stable. He did not do the GES. TG  3/1/21 Angel presents for follow up for HB and early satiety. His GES showed mild delay of 57 minutes. He reports his heartburn is better since he started taking the famotidine daily. He is not taking it twice daily. He does have some  breakthrough symptos occasionally but for the  most part its well controlled. His early satiety seems to be better since starting the famotidine as well. Discussed this is likely secondary to uncontrolled diabetes and he reports his blood sugars have been more controlled recently. He has noticed more anxiety recently and plans to talk to his PCP about this. His weight is stable. He has no new complaints. TG  6/25/21 Angel presents for follow up today with reports of rectal bleeding. Stools are dark black/dark gray looking and were hemoccult positive with Dr. Daniele Davenport, his new PCP. This started on Monday. He has 1-2 stools daily. Stools are soft or formed but not diarrhea. He reports some dizziness and lightheadedness that he associates with anxiety that seems to bother him more while trying to sleep at night. He was started on Zoloft a few days ago. He has palpitations and heart racing. He went to the ER for palpitations and was told everything was okay. This was at [facility] ER about a week ago. He had dialysis yesterday and was okay throughout.  Denies abdominal pain. His nausea/vomiting is at baseline. He does have hx of gastroparesis. He continues with heartburn. He stopped the famotidine. TG  7/2/21 Angel presents for follow up. He did go to the ER last week for the new black stools. His hemoglobin was stable at 11.8 and his CT showed no acute findings. He was started on lansoprazole 30mg once daily and to follow up in clinic. He continues to have 2-3 black stools that are soft daily. My medical assistant, Samantha, was able to get updated medication today and he was started on Velphoro about the same time that we began with the black stools. Discussed this medication can cause dark stools.  He reports his heartburn is better on the lansoprazole. He was not taking the famotidine regularly prior to this. TG  10/1/21 Angel presents for follow up. His heartburn and reflux symptoms resolved with pantoprazole 20mg once daily. He has started a few new medications from his PCP but cannot recall the names. He will bring a list to his next visit. He feels like solids and liquids stick in the upper throat region before finally passing down. He does not bring this back up. TG

## 2021-11-23 NOTE — PHYSICAL EXAM HENT:
Group Therapy Note    Date: 11/23/2021    Group Start Time: 1430  Group End Time: 6598  Group Topic: Music Therapy    [facility] LATASHA Urbano        Group Therapy Note    Attendees: 5/19         Patient's Goal:  Patients shared preferred music and answered questions based on song selections, analyzed lyrics, and shared interpretations. Patients goals to increase cognitive stimulation; Increase self-expression; Increase sense of community; Increase socialiaztion    Notes:  Patient attended and participated in group having positive interactions with peers and staff. Engaged as an active listener and engaged in conversations. Shared two songs and talked about his family. Status After Intervention:  Improved    Participation Level:  Active Listener and Interactive    Participation Quality: Appropriate, Attentive and Sharing      Speech:  normal      Thought Process/Content: Logical  Linear      Affective Functioning: Congruent      Mood: euthymic      Level of consciousness:  Alert and Attentive      Response to Learning: Able to verbalize current knowledge/experience and Progressing to goal      Endings: None Reported    Modes of Intervention: Support, Socialization, Exploration, Activity, Media and Reality-testing      Discipline Responsible: Psychoeducational Specialist      Signature:  Ashley Urbano Head,  normocephalic,  atraumatic, comfortable appearance/resting/sleeping

## 2022-01-14 ENCOUNTER — LAB OUTSIDE AN ENCOUNTER (OUTPATIENT)
Dept: URBAN - NONMETROPOLITAN AREA CLINIC 2 | Facility: CLINIC | Age: 52
End: 2022-01-14

## 2022-01-14 ENCOUNTER — OFFICE VISIT (OUTPATIENT)
Dept: URBAN - NONMETROPOLITAN AREA CLINIC 2 | Facility: CLINIC | Age: 52
End: 2022-01-14
Payer: MEDICARE

## 2022-01-14 DIAGNOSIS — D64.9 ANEMIA, UNSPECIFIED TYPE: ICD-10-CM

## 2022-01-14 DIAGNOSIS — R13.19 ESOPHAGEAL DYSPHAGIA: ICD-10-CM

## 2022-01-14 DIAGNOSIS — K31.84 GASTROPARESIS: ICD-10-CM

## 2022-01-14 DIAGNOSIS — N18.6 END STAGE RENAL DISEASE: ICD-10-CM

## 2022-01-14 PROCEDURE — 99213 OFFICE O/P EST LOW 20 MIN: CPT | Performed by: NURSE PRACTITIONER

## 2022-01-14 RX ORDER — PANTOPRAZOLE SODIUM 20 MG/1
1 TABLET TABLET, DELAYED RELEASE ORAL ONCE A DAY
OUTPATIENT

## 2022-01-14 RX ORDER — CALCIUM ACETATE 667 MG
2 TABLETS WITH MEALS TABLET ORAL THREE TIMES A DAY
Status: ON HOLD | COMMUNITY

## 2022-01-14 RX ORDER — PANTOPRAZOLE SODIUM 20 MG/1
1 TABLET TABLET, DELAYED RELEASE ORAL ONCE A DAY
Status: ACTIVE | COMMUNITY

## 2022-01-14 RX ORDER — SERTRALINE HYDROCHLORIDE 50 MG/1
TAKE 1 TABLET BY MOUTH ONCE DAILY TABLET ORAL
Qty: 30 | Refills: 0 | Status: ON HOLD | COMMUNITY

## 2022-01-14 RX ORDER — ATORVASTATIN CALCIUM 20 MG/1
1 TABLET TABLET, FILM COATED ORAL ONCE A DAY
Status: ACTIVE | COMMUNITY

## 2022-01-14 RX ORDER — SUCROFERRIC OXYHYDROXIDE 500 MG/1
TABLET, CHEWABLE ORAL
Qty: 120 | Status: ACTIVE | COMMUNITY

## 2022-01-14 RX ORDER — CALCITRIOL 0.25 UG/1
1 CAPSULE CAPSULE ORAL ONCE A DAY
Status: ACTIVE | COMMUNITY

## 2022-01-14 RX ORDER — NIFEDIPINE 60 MG/1
TABLET, FILM COATED, EXTENDED RELEASE ORAL
Qty: 90 | Status: ACTIVE | COMMUNITY

## 2022-01-14 RX ORDER — HYDROXYZINE HYDROCHLORIDE 25 MG/1
TAKE 1 TABLET BY MOUTH EVERY 8 HOURS AS NEEDED FOR ANXIETY TABLET ORAL
Qty: 60 | Refills: 0 | Status: ON HOLD | COMMUNITY

## 2022-01-14 RX ORDER — DULAGLUTIDE 1.5 MG/.5ML
AS DIRECTED INJECTION, SOLUTION SUBCUTANEOUS
Status: ACTIVE | COMMUNITY

## 2022-01-14 RX ORDER — ALPRAZOLAM 0.5 MG/1
TAKE 1 TABLET BY MOUTH TWICE DAILY AS NEEDED TABLET ORAL
Qty: 60 | Refills: 0 | Status: ACTIVE | COMMUNITY

## 2022-01-14 RX ORDER — BUPROPION HYDROCHLORIDE 100 MG/1
1 TABLET TABLET ORAL TWICE A DAY
Status: ACTIVE | COMMUNITY

## 2022-01-14 RX ORDER — INSULIN GLARGINE 100 [IU]/ML
AS DIRECTED INJECTION, SOLUTION SUBCUTANEOUS
Status: ACTIVE | COMMUNITY

## 2022-01-14 RX ORDER — INSULIN ASPART 100 [IU]/ML
AS DIRECTED INJECTION, SOLUTION INTRAVENOUS; SUBCUTANEOUS
Status: ACTIVE | COMMUNITY

## 2022-01-14 RX ORDER — FAMOTIDINE 20 MG/1
1 TABLET AT LUNCH AND 1 TABLET AT  BEDTIME TABLET, FILM COATED ORAL TWICE A DAY
Status: ON HOLD | COMMUNITY

## 2022-01-14 NOTE — HPI-TODAY'S VISIT:
1/14/2022 Angel presents for follow up for GERD and dysphagia. He did not get the UGI done. He continues to have trouble with food hanging in the upper chest region daily. This passes with a sip of water. His heartburn is better and rarely has issue. He is taking pantoprazole 20mg as needed, typically a few times a week. He has not tried taking this daily.  Otherwise, he feels well and has no acute complaints.   CT 2021: no acute findings.  GES 2021: mild delay 57 minutes. Colonoscopy 10/26/20 with a polyp in the rectum and simgoid, noted to have sigmoid spasm, mild diverticulosis of the ascenidng and sigmoid colon, normal TI. Path with HP polyps, to repeat in 10 years. EGD 9/28/2020 at the hospital with normal mucosa of the esophagus, stomach, and duodenum path consistent with chronic gastritis and esophagitis, no H. pylori, EOE, or Goodwin's.

## 2022-04-15 ENCOUNTER — OFFICE VISIT (OUTPATIENT)
Dept: URBAN - NONMETROPOLITAN AREA CLINIC 13 | Facility: CLINIC | Age: 52
End: 2022-04-15
Payer: MEDICARE

## 2022-04-15 VITALS
BODY MASS INDEX: 24.41 KG/M2 | TEMPERATURE: 97.8 F | SYSTOLIC BLOOD PRESSURE: 129 MMHG | HEIGHT: 72 IN | HEART RATE: 97 BPM | DIASTOLIC BLOOD PRESSURE: 74 MMHG | WEIGHT: 180.2 LBS

## 2022-04-15 DIAGNOSIS — Z12.11 COLON CANCER SCREENING: ICD-10-CM

## 2022-04-15 DIAGNOSIS — K31.84 GASTROPARESIS: ICD-10-CM

## 2022-04-15 DIAGNOSIS — K59.09 CONSTIPATION: ICD-10-CM

## 2022-04-15 DIAGNOSIS — D64.9 ANEMIA, UNSPECIFIED TYPE: ICD-10-CM

## 2022-04-15 DIAGNOSIS — R13.19 ESOPHAGEAL DYSPHAGIA: ICD-10-CM

## 2022-04-15 DIAGNOSIS — K21.9 GERD WITHOUT ESOPHAGITIS: ICD-10-CM

## 2022-04-15 DIAGNOSIS — N18.6 END STAGE RENAL DISEASE: ICD-10-CM

## 2022-04-15 PROCEDURE — 99213 OFFICE O/P EST LOW 20 MIN: CPT | Performed by: NURSE PRACTITIONER

## 2022-04-15 RX ORDER — PANTOPRAZOLE SODIUM 20 MG/1
1 TABLET TABLET, DELAYED RELEASE ORAL ONCE A DAY
Status: ACTIVE | COMMUNITY

## 2022-04-15 RX ORDER — FAMOTIDINE 20 MG/1
1 TABLET BEFORE MEALS AND AT BEDTIME TABLET, FILM COATED ORAL
Qty: 120 TABLETS | Refills: 0 | OUTPATIENT
Start: 2022-04-15

## 2022-04-15 RX ORDER — FAMOTIDINE 20 MG/1
1 TABLET AT LUNCH AND 1 TABLET AT  BEDTIME TABLET, FILM COATED ORAL TWICE A DAY
Status: ON HOLD | COMMUNITY

## 2022-04-15 RX ORDER — ATORVASTATIN CALCIUM 20 MG/1
1 TABLET TABLET, FILM COATED ORAL ONCE A DAY
Status: ACTIVE | COMMUNITY

## 2022-04-15 RX ORDER — DULAGLUTIDE 1.5 MG/.5ML
AS DIRECTED INJECTION, SOLUTION SUBCUTANEOUS
Status: ACTIVE | COMMUNITY

## 2022-04-15 RX ORDER — INSULIN ASPART 100 [IU]/ML
AS DIRECTED INJECTION, SOLUTION INTRAVENOUS; SUBCUTANEOUS
Status: ACTIVE | COMMUNITY

## 2022-04-15 RX ORDER — CALCIUM ACETATE 667 MG
2 TABLETS WITH MEALS TABLET ORAL THREE TIMES A DAY
Status: ON HOLD | COMMUNITY

## 2022-04-15 RX ORDER — SERTRALINE HYDROCHLORIDE 50 MG/1
TAKE 1 TABLET BY MOUTH ONCE DAILY TABLET ORAL
Qty: 30 | Refills: 0 | Status: ON HOLD | COMMUNITY

## 2022-04-15 RX ORDER — INSULIN GLARGINE 100 [IU]/ML
AS DIRECTED INJECTION, SOLUTION SUBCUTANEOUS
Status: ACTIVE | COMMUNITY

## 2022-04-15 RX ORDER — CALCITRIOL 0.25 UG/1
1 CAPSULE CAPSULE ORAL ONCE A DAY
Status: ACTIVE | COMMUNITY

## 2022-04-15 RX ORDER — NIFEDIPINE 60 MG/1
TABLET, FILM COATED, EXTENDED RELEASE ORAL
Qty: 90 | Status: ACTIVE | COMMUNITY

## 2022-04-15 RX ORDER — HYDROXYZINE HYDROCHLORIDE 25 MG/1
TAKE 1 TABLET BY MOUTH EVERY 8 HOURS AS NEEDED FOR ANXIETY TABLET ORAL
Qty: 60 | Refills: 0 | Status: ON HOLD | COMMUNITY

## 2022-04-15 RX ORDER — ALPRAZOLAM 0.5 MG/1
TAKE 1 TABLET BY MOUTH TWICE DAILY AS NEEDED TABLET ORAL
Qty: 60 | Refills: 0 | Status: ACTIVE | COMMUNITY

## 2022-04-15 RX ORDER — SUCROFERRIC OXYHYDROXIDE 500 MG/1
TABLET, CHEWABLE ORAL
Qty: 120 | Status: ACTIVE | COMMUNITY

## 2022-04-15 RX ORDER — BUPROPION HYDROCHLORIDE 100 MG/1
1 TABLET TABLET ORAL TWICE A DAY
Status: ACTIVE | COMMUNITY

## 2022-04-15 NOTE — HPI-TODAY'S VISIT:
1/14/2022 Angel presents for follow up for GERD and dysphagia. He did not get the UGI done. He continues to have trouble with food hanging in the upper chest region daily. This passes with a sip of water. His heartburn is better and rarely has issue. He is taking pantoprazole 20mg as needed, typically a few times a week. He has not tried taking this daily.  Otherwise, he feels well and has no acute complaints.   CT 2021: no acute findings.  GES 2021: mild delay 57 minutes. Colonoscopy 10/26/20 with a polyp in the rectum and simgoid, noted to have sigmoid spasm, mild diverticulosis of the ascenidng and sigmoid colon, normal TI. Path with HP polyps, to repeat in 10 years. EGD 9/28/2020 at the hospital with normal mucosa of the esophagus, stomach, and duodenum path consistent with chronic gastritis and esophagitis, no H. pylori, EOE, or Goodwin's.  4/15/22 Patient presents for follow up for dysphagia. He reports his gerd is better on pantoprazole 20mg once daily. He continues to struggle with dysphagia for solids. He had UGI 3/2/2022 taht shows moderate spontaneous GERD and mild esophageal dysmotility; 13mm barium tablet passed freely into the stomach. No strictures of mass lesions. No hiatal hernia. No weight loss. TG

## 2022-07-15 ENCOUNTER — OFFICE VISIT (OUTPATIENT)
Dept: URBAN - NONMETROPOLITAN AREA CLINIC 13 | Facility: CLINIC | Age: 52
End: 2022-07-15

## 2022-08-31 ENCOUNTER — OFFICE VISIT (OUTPATIENT)
Dept: URBAN - NONMETROPOLITAN AREA CLINIC 4 | Facility: CLINIC | Age: 52
End: 2022-08-31
Payer: MEDICARE

## 2022-08-31 VITALS
BODY MASS INDEX: 24.06 KG/M2 | HEART RATE: 95 BPM | DIASTOLIC BLOOD PRESSURE: 71 MMHG | SYSTOLIC BLOOD PRESSURE: 125 MMHG | WEIGHT: 177.6 LBS | TEMPERATURE: 96.8 F | HEIGHT: 72 IN

## 2022-08-31 DIAGNOSIS — K31.84 GASTROPARESIS: ICD-10-CM

## 2022-08-31 DIAGNOSIS — K21.9 GERD WITHOUT ESOPHAGITIS: ICD-10-CM

## 2022-08-31 DIAGNOSIS — R13.19 ESOPHAGEAL DYSPHAGIA: ICD-10-CM

## 2022-08-31 DIAGNOSIS — D64.9 ANEMIA, UNSPECIFIED TYPE: ICD-10-CM

## 2022-08-31 DIAGNOSIS — Z12.11 COLON CANCER SCREENING: ICD-10-CM

## 2022-08-31 DIAGNOSIS — N18.6 END STAGE RENAL DISEASE: ICD-10-CM

## 2022-08-31 DIAGNOSIS — K59.01 CONSTIPATION BY DELAYED COLONIC TRANSIT: ICD-10-CM

## 2022-08-31 PROBLEM — 46177005: Status: ACTIVE | Noted: 2020-09-23

## 2022-08-31 PROBLEM — 35298007 CONSTIPATION BY DELAYED COLONIC TRANSIT: Status: ACTIVE | Noted: 2021-03-01

## 2022-08-31 PROBLEM — 266435005: Status: ACTIVE | Noted: 2022-04-15

## 2022-08-31 PROCEDURE — 99213 OFFICE O/P EST LOW 20 MIN: CPT | Performed by: INTERNAL MEDICINE

## 2022-08-31 RX ORDER — FAMOTIDINE 20 MG/1
1 TABLET BEFORE MEALS AND AT BEDTIME TABLET, FILM COATED ORAL
Qty: 120 TABLETS | Refills: 0 | Status: ACTIVE | COMMUNITY
Start: 2022-04-15

## 2022-08-31 RX ORDER — CALCIUM ACETATE 667 MG
2 TABLETS WITH MEALS TABLET ORAL THREE TIMES A DAY
Status: DISCONTINUED | COMMUNITY

## 2022-08-31 RX ORDER — CALCITRIOL 0.25 UG/1
1 CAPSULE CAPSULE ORAL ONCE A DAY
Status: ACTIVE | COMMUNITY

## 2022-08-31 RX ORDER — INSULIN GLARGINE 100 [IU]/ML
AS DIRECTED INJECTION, SOLUTION SUBCUTANEOUS DAILY
Status: ACTIVE | COMMUNITY

## 2022-08-31 RX ORDER — PANTOPRAZOLE SODIUM 20 MG/1
1 TABLET TABLET, DELAYED RELEASE ORAL ONCE A DAY
Status: ACTIVE | COMMUNITY

## 2022-08-31 RX ORDER — ALPRAZOLAM 0.5 MG/1
1 TABLET TABLET ORAL TWICE A DAY
Refills: 0 | Status: ACTIVE | COMMUNITY

## 2022-08-31 RX ORDER — BUPROPION HYDROCHLORIDE 100 MG/1
1 TABLET TABLET ORAL TWICE A DAY
Status: ACTIVE | COMMUNITY

## 2022-08-31 RX ORDER — FAMOTIDINE 20 MG/1
1 TABLET BEFORE MEALS AND AT BEDTIME TABLET, FILM COATED ORAL
Qty: 120 TABLETS | Refills: 0 | OUTPATIENT

## 2022-08-31 RX ORDER — DULAGLUTIDE 1.5 MG/.5ML
AS DIRECTED INJECTION, SOLUTION SUBCUTANEOUS DAILY
Status: ACTIVE | COMMUNITY

## 2022-08-31 RX ORDER — SERTRALINE HYDROCHLORIDE 50 MG/1
TAKE 1 TABLET BY MOUTH ONCE DAILY TABLET ORAL
Qty: 30 | Refills: 0 | Status: DISCONTINUED | COMMUNITY

## 2022-08-31 RX ORDER — INSULIN ASPART 100 [IU]/ML
AS DIRECTED INJECTION, SOLUTION INTRAVENOUS; SUBCUTANEOUS DAILY
Status: ACTIVE | COMMUNITY

## 2022-08-31 RX ORDER — HYDROXYZINE HYDROCHLORIDE 25 MG/1
TAKE 1 TABLET BY MOUTH EVERY 8 HOURS AS NEEDED FOR ANXIETY TABLET ORAL
Qty: 60 | Refills: 0 | Status: DISCONTINUED | COMMUNITY

## 2022-08-31 RX ORDER — NIFEDIPINE 60 MG/1
1 TABLET TABLET, FILM COATED, EXTENDED RELEASE ORAL ONCE A DAY
Status: ACTIVE | COMMUNITY

## 2022-08-31 RX ORDER — SUCROFERRIC OXYHYDROXIDE 500 MG/1
1 TABLET WITH MEALS TABLET, CHEWABLE ORAL TWICE A DAY
Status: ACTIVE | COMMUNITY

## 2022-08-31 RX ORDER — FAMOTIDINE 20 MG/1
1 TABLET AT LUNCH AND 1 TABLET AT  BEDTIME TABLET, FILM COATED ORAL TWICE A DAY
Status: DISCONTINUED | COMMUNITY

## 2022-08-31 RX ORDER — ATORVASTATIN CALCIUM 20 MG/1
1 TABLET TABLET, FILM COATED ORAL ONCE A DAY
Status: ACTIVE | COMMUNITY

## 2022-08-31 NOTE — PHYSICAL EXAM GASTROINTESTINAL
Abdomen , soft, nontender, nondistended , no guarding or rigidity , no masses palpable , normal bowel sounds , Liver and Spleen , no hepatomegaly present , no hepatosplenomegaly , liver nontender , spleen not palpable Physical Therapy Discharge Note        Subjective     Narciso Niasil reports: Exercises helping. Walked 2 miles to get here and no pain    Objective   See Exercise, Manual, and Modality Logs for complete treatment.       Assessment/Plan   Short Term Goals (2 weeks) MET  1. Pt to be (I) with HEP  2. Pt to report less pain with walking > 1 mile  3. Pt to exhibit U bridge L with improved stability    Long Term Goals: (8 weeks)  1. Pt to exhibit no pain endrange flexion on L knee MET  2. Pt to score >60/80 on LEFS NT  3. Pt to perform 20 reps U bridge on L strength to allow for more strenuous ADL's and recreational activities NT    Suggested powersteps for shoes for improving alignment with standing/walking      Other  DC to (I) HEP         Manual Therapy:   5      mins  35165;  Therapeutic Exercise: 25      mins  18983;     Neuromuscular Berto:       mins  50742;    Therapeutic Activity:         mins  42880;     Gait Training:         mins  99783;     Ultrasound:         mins  81254;    Electrical Stimulation:        mins  53108 ( );  Dry Needling         mins self-pay    Timed Treatment:   30   mins   Total Treatment:     30   mins    Luciana Zambrano, PT  Physical Therapist  [place] License # [id number]

## 2022-08-31 NOTE — HPI-TODAY'S VISIT:
1/14/2022 Angel presents for follow up for GERD and dysphagia. He did not get the UGI done. He continues to have trouble with food hanging in the upper chest region daily. This passes with a sip of water. His heartburn is better and rarely has issue. He is taking pantoprazole 20mg as needed, typically a few times a week. He has not tried taking this daily.  Otherwise, he feels well and has no acute complaints.   CT 2021: no acute findings.  GES 2021: mild delay 57 minutes. Colonoscopy 10/26/20 with a polyp in the rectum and simgoid, noted to have sigmoid spasm, mild diverticulosis of the ascenidng and sigmoid colon, normal TI. Path with HP polyps, to repeat in 10 years. EGD 9/28/2020 at the hospital with normal mucosa of the esophagus, stomach, and duodenum path consistent with chronic gastritis and esophagitis, no H. pylori, EOE, or Goodwin's.  4/15/22 Patient presents for follow up for dysphagia. He reports his gerd is better on pantoprazole 20mg once daily. He continues to struggle with dysphagia for solids. He had UGI 3/2/2022 taht shows moderate spontaneous GERD and mild esophageal dysmotility; 13mm barium tablet passed freely into the stomach. No strictures of mass lesions. No hiatal hernia. No weight loss. TG  Follow Up 8/31/22: Patient presents for scheduling a colonoscopy. He is undergoing kidney transplant workup at [facility]. A CT in June 2022 revealed a questionable polyp in the sigmoid colon.  His last exam was by Dr. Seals in 2020 (2 small hyperplastic polyps). He is not due until 2030. No family history of CRC. No bowel complaints.

## 2022-09-13 ENCOUNTER — TELEPHONE ENCOUNTER (OUTPATIENT)
Dept: URBAN - METROPOLITAN AREA CLINIC 63 | Facility: CLINIC | Age: 52
End: 2022-09-13

## 2022-10-17 ENCOUNTER — OFFICE VISIT (OUTPATIENT)
Dept: URBAN - NONMETROPOLITAN AREA CLINIC 13 | Facility: CLINIC | Age: 52
End: 2022-10-17
Payer: MEDICARE

## 2022-10-17 VITALS
SYSTOLIC BLOOD PRESSURE: 145 MMHG | HEART RATE: 98 BPM | WEIGHT: 184.4 LBS | BODY MASS INDEX: 24.98 KG/M2 | DIASTOLIC BLOOD PRESSURE: 74 MMHG | HEIGHT: 72 IN

## 2022-10-17 DIAGNOSIS — R13.19 ESOPHAGEAL DYSPHAGIA: ICD-10-CM

## 2022-10-17 DIAGNOSIS — Z12.11 COLON CANCER SCREENING: ICD-10-CM

## 2022-10-17 DIAGNOSIS — K21.00 GASTROESOPHAGEAL REFLUX DISEASE WITH ESOPHAGITIS WITHOUT HEMORRHAGE: ICD-10-CM

## 2022-10-17 PROBLEM — 266433003: Status: ACTIVE | Noted: 2022-10-17

## 2022-10-17 PROBLEM — 305058001: Status: ACTIVE | Noted: 2021-06-25

## 2022-10-17 PROCEDURE — 99214 OFFICE O/P EST MOD 30 MIN: CPT | Performed by: NURSE PRACTITIONER

## 2022-10-17 RX ORDER — INSULIN GLARGINE 100 [IU]/ML
AS DIRECTED INJECTION, SOLUTION SUBCUTANEOUS DAILY
Status: ACTIVE | COMMUNITY

## 2022-10-17 RX ORDER — PANTOPRAZOLE SODIUM 20 MG/1
1 TABLET TABLET, DELAYED RELEASE ORAL ONCE A DAY
Qty: 30 TABLET | Refills: 6

## 2022-10-17 RX ORDER — BUPROPION HYDROCHLORIDE 100 MG/1
1 TABLET TABLET ORAL TWICE A DAY
Status: ACTIVE | COMMUNITY

## 2022-10-17 RX ORDER — NIFEDIPINE 60 MG/1
1 TABLET TABLET, FILM COATED, EXTENDED RELEASE ORAL ONCE A DAY
Status: ACTIVE | COMMUNITY

## 2022-10-17 RX ORDER — CALCITRIOL 0.25 UG/1
1 CAPSULE CAPSULE ORAL ONCE A DAY
Status: ACTIVE | COMMUNITY

## 2022-10-17 RX ORDER — FAMOTIDINE 20 MG/1
1 TABLET BEFORE MEALS AND AT BEDTIME TABLET, FILM COATED ORAL
Qty: 120 TABLETS | Refills: 0 | Status: ACTIVE | COMMUNITY

## 2022-10-17 RX ORDER — INSULIN ASPART 100 [IU]/ML
AS DIRECTED INJECTION, SOLUTION INTRAVENOUS; SUBCUTANEOUS DAILY
Status: ACTIVE | COMMUNITY

## 2022-10-17 RX ORDER — SUCROFERRIC OXYHYDROXIDE 500 MG/1
1 TABLET WITH MEALS TABLET, CHEWABLE ORAL TWICE A DAY
Status: ACTIVE | COMMUNITY

## 2022-10-17 RX ORDER — ALPRAZOLAM 0.5 MG/1
1 TABLET TABLET ORAL TWICE A DAY
Refills: 0 | Status: ACTIVE | COMMUNITY

## 2022-10-17 RX ORDER — FAMOTIDINE 40 MG/1
1 TABLET TABLET, FILM COATED ORAL TWICE A DAY
Qty: 60 TABLET | Refills: 0

## 2022-10-17 RX ORDER — ATORVASTATIN CALCIUM 20 MG/1
1 TABLET TABLET, FILM COATED ORAL ONCE A DAY
Status: ACTIVE | COMMUNITY

## 2022-10-17 RX ORDER — DULAGLUTIDE 1.5 MG/.5ML
AS DIRECTED INJECTION, SOLUTION SUBCUTANEOUS DAILY
Status: ACTIVE | COMMUNITY

## 2022-10-17 RX ORDER — PANTOPRAZOLE SODIUM 20 MG/1
1 TABLET TABLET, DELAYED RELEASE ORAL ONCE A DAY
Status: ACTIVE | COMMUNITY

## 2022-10-17 NOTE — HPI-TODAY'S VISIT:
Mr. Wilburn is a 52-year-old male with a past medical history of end-stage renal disease on hemodialysis Tuesday, Thursday, and Friday who returns for follow-up of dysphagia and reflux.  Previously seen by Dr. Fabian Middleton.  He does report that his indigestion is much improved with Pepcid and pantoprazole daily.  He is out of his pantoprazole and has noticed some additional heartburn since running out of this.  He wishes to resume.  He does describe some cervical dysphagia with each meal.  He states it is typically solids.  He does have some regurgitation.  Does report he takes a baby aspirin daily sb Prior work-up includes 3/2/2022 upper GI with reflux and mild esophageal dysmotility 11/23/2020 gastric emptying study with a mild delay of 57 minutes 10/26/2020 colonoscopy colonoscopy with small hyperplastic polyps, colon spasm, and diverticulosis 9/28/2020 EGD with some gastritis and esophagitis

## 2022-11-07 ENCOUNTER — OFFICE VISIT (OUTPATIENT)
Dept: URBAN - METROPOLITAN AREA MEDICAL CENTER 1 | Facility: MEDICAL CENTER | Age: 52
End: 2022-11-07
Payer: MEDICARE

## 2022-11-07 ENCOUNTER — LAB OUTSIDE AN ENCOUNTER (OUTPATIENT)
Dept: URBAN - NONMETROPOLITAN AREA CLINIC 2 | Facility: CLINIC | Age: 52
End: 2022-11-07

## 2022-11-07 DIAGNOSIS — K22.89 DILATATION OF ESOPHAGUS: ICD-10-CM

## 2022-11-07 DIAGNOSIS — R13.19 CERVICAL DYSPHAGIA: ICD-10-CM

## 2022-11-07 PROCEDURE — 43239 EGD BIOPSY SINGLE/MULTIPLE: CPT | Performed by: INTERNAL MEDICINE

## 2022-11-07 PROCEDURE — 43249 ESOPH EGD DILATION <30 MM: CPT | Performed by: INTERNAL MEDICINE

## 2022-11-08 LAB
AP CASE REPORT: (no result)
AP FINAL DIAGNOSIS: (no result)
AP GROSS DESCRIPTION: (no result)
AP MICROSCOPIC DESCRIPTION: (no result)

## 2023-02-13 ENCOUNTER — OFFICE VISIT (OUTPATIENT)
Dept: URBAN - NONMETROPOLITAN AREA CLINIC 13 | Facility: CLINIC | Age: 53
End: 2023-02-13

## 2023-06-19 ENCOUNTER — OFFICE VISIT (OUTPATIENT)
Dept: URBAN - NONMETROPOLITAN AREA CLINIC 13 | Facility: CLINIC | Age: 53
End: 2023-06-19
Payer: MEDICARE

## 2023-06-19 VITALS
WEIGHT: 183 LBS | TEMPERATURE: 98.2 F | HEIGHT: 72 IN | DIASTOLIC BLOOD PRESSURE: 76 MMHG | HEART RATE: 101 BPM | SYSTOLIC BLOOD PRESSURE: 153 MMHG | BODY MASS INDEX: 24.79 KG/M2

## 2023-06-19 DIAGNOSIS — K21.00 GASTROESOPHAGEAL REFLUX DISEASE WITH ESOPHAGITIS WITHOUT HEMORRHAGE: ICD-10-CM

## 2023-06-19 DIAGNOSIS — Z12.11 COLON CANCER SCREENING: ICD-10-CM

## 2023-06-19 DIAGNOSIS — R13.19 ESOPHAGEAL DYSPHAGIA: ICD-10-CM

## 2023-06-19 PROCEDURE — 99213 OFFICE O/P EST LOW 20 MIN: CPT | Performed by: NURSE PRACTITIONER

## 2023-06-19 RX ORDER — INSULIN GLARGINE 100 [IU]/ML
AS DIRECTED INJECTION, SOLUTION SUBCUTANEOUS DAILY
Status: ACTIVE | COMMUNITY

## 2023-06-19 RX ORDER — SUCROFERRIC OXYHYDROXIDE 500 MG/1
1 TABLET WITH MEALS TABLET, CHEWABLE ORAL TWICE A DAY
Status: ACTIVE | COMMUNITY

## 2023-06-19 RX ORDER — ALPRAZOLAM 0.5 MG/1
1 TABLET TABLET ORAL TWICE A DAY
Refills: 0 | Status: ACTIVE | COMMUNITY

## 2023-06-19 RX ORDER — CALCITRIOL 0.25 UG/1
1 CAPSULE CAPSULE ORAL ONCE A DAY
Status: ACTIVE | COMMUNITY

## 2023-06-19 RX ORDER — FAMOTIDINE 40 MG/1
1 TABLET TABLET, FILM COATED ORAL TWICE A DAY
Qty: 60 TABLET | Refills: 0 | Status: ACTIVE | COMMUNITY

## 2023-06-19 RX ORDER — PANTOPRAZOLE SODIUM 20 MG/1
1 TABLET TABLET, DELAYED RELEASE ORAL ONCE A DAY
Qty: 30 TABLET | Refills: 6 | Status: ACTIVE | COMMUNITY

## 2023-06-19 RX ORDER — PANTOPRAZOLE SODIUM 40 MG/1
1 TABLET TABLET, DELAYED RELEASE ORAL ONCE A DAY
Qty: 90 TABLET | Refills: 3 | OUTPATIENT
Start: 2023-06-19

## 2023-06-19 RX ORDER — NIFEDIPINE 60 MG/1
1 TABLET TABLET, FILM COATED, EXTENDED RELEASE ORAL ONCE A DAY
Status: ACTIVE | COMMUNITY

## 2023-06-19 RX ORDER — INSULIN ASPART 100 [IU]/ML
AS DIRECTED INJECTION, SOLUTION INTRAVENOUS; SUBCUTANEOUS DAILY
Status: ACTIVE | COMMUNITY

## 2023-06-19 RX ORDER — BUPROPION HYDROCHLORIDE 100 MG/1
1 TABLET TABLET ORAL TWICE A DAY
Status: ACTIVE | COMMUNITY

## 2023-06-19 RX ORDER — DULAGLUTIDE 1.5 MG/.5ML
AS DIRECTED INJECTION, SOLUTION SUBCUTANEOUS DAILY
Status: ACTIVE | COMMUNITY

## 2023-06-19 RX ORDER — ATORVASTATIN CALCIUM 20 MG/1
1 TABLET TABLET, FILM COATED ORAL ONCE A DAY
Status: ACTIVE | COMMUNITY

## 2023-06-19 NOTE — HPI-TODAY'S VISIT:
Mr. Wilburn is a 52-year-old male with a past medical history of end-stage renal disease on hemodialysis Tuesday, Thursday, and saturday who returns for follow-up of dysphagia and reflux.  Previously seen by Dr. Seals and Emi.  He continues to have a globus sensation even following recent dil.   He stopped his famotidine, but remains on pantoprazole 20mg daily.   Does report he takes a baby aspirin daily sb Prior work-up includes  11/22 EGD with dil, wnl 3/2/2022 upper GI with reflux and mild esophageal dysmotility 11/23/2020 gastric emptying study with a mild delay of 57 minutes 10/26/2020 colonoscopy colonoscopy with small hyperplastic polyps, colon spasm, and diverticulosis 9/28/2020 EGD with some gastritis and esophagitis

## 2023-10-11 ENCOUNTER — OFFICE VISIT (OUTPATIENT)
Dept: URBAN - NONMETROPOLITAN AREA CLINIC 2 | Facility: CLINIC | Age: 53
End: 2023-10-11

## 2023-10-11 RX ORDER — INSULIN GLARGINE 100 [IU]/ML
AS DIRECTED INJECTION, SOLUTION SUBCUTANEOUS DAILY
Status: ACTIVE | COMMUNITY

## 2023-10-11 RX ORDER — SUCROFERRIC OXYHYDROXIDE 500 MG/1
1 TABLET WITH MEALS TABLET, CHEWABLE ORAL TWICE A DAY
Status: ACTIVE | COMMUNITY

## 2023-10-11 RX ORDER — PANTOPRAZOLE SODIUM 40 MG/1
1 TABLET TABLET, DELAYED RELEASE ORAL ONCE A DAY
Qty: 90 TABLET | Refills: 3 | Status: ACTIVE | COMMUNITY
Start: 2023-06-19

## 2023-10-11 RX ORDER — NIFEDIPINE 60 MG/1
1 TABLET TABLET, FILM COATED, EXTENDED RELEASE ORAL ONCE A DAY
Status: ACTIVE | COMMUNITY

## 2023-10-11 RX ORDER — DULAGLUTIDE 1.5 MG/.5ML
AS DIRECTED INJECTION, SOLUTION SUBCUTANEOUS DAILY
Status: ACTIVE | COMMUNITY

## 2023-10-11 RX ORDER — ALPRAZOLAM 0.5 MG/1
1 TABLET TABLET ORAL TWICE A DAY
Refills: 0 | Status: ACTIVE | COMMUNITY

## 2023-10-11 RX ORDER — ATORVASTATIN CALCIUM 20 MG/1
1 TABLET TABLET, FILM COATED ORAL ONCE A DAY
Status: ACTIVE | COMMUNITY

## 2023-10-11 RX ORDER — PANTOPRAZOLE SODIUM 40 MG/1
1 TABLET TABLET, DELAYED RELEASE ORAL ONCE A DAY
Qty: 90 TABLET | Refills: 3 | OUTPATIENT

## 2023-10-11 RX ORDER — PANTOPRAZOLE SODIUM 20 MG/1
1 TABLET TABLET, DELAYED RELEASE ORAL ONCE A DAY
Qty: 30 TABLET | Refills: 6 | Status: ACTIVE | COMMUNITY

## 2023-10-11 RX ORDER — BUPROPION HYDROCHLORIDE 100 MG/1
1 TABLET TABLET ORAL TWICE A DAY
Status: ACTIVE | COMMUNITY

## 2023-10-11 RX ORDER — INSULIN ASPART 100 [IU]/ML
AS DIRECTED INJECTION, SOLUTION INTRAVENOUS; SUBCUTANEOUS DAILY
Status: ACTIVE | COMMUNITY

## 2023-10-11 RX ORDER — CALCITRIOL 0.25 UG/1
1 CAPSULE CAPSULE ORAL ONCE A DAY
Status: ACTIVE | COMMUNITY

## 2023-10-11 RX ORDER — FAMOTIDINE 40 MG/1
1 TABLET TABLET, FILM COATED ORAL TWICE A DAY
Qty: 60 TABLET | Refills: 0 | Status: ACTIVE | COMMUNITY

## 2023-10-11 NOTE — HPI-TODAY'S VISIT:
10/11/23: Mr. Wilburn returns for follow-up of dysphagia and globus sensation.    6/19/23: Mr. Wilburn is a 52-year-old male with a past medical history of end-stage renal disease on hemodialysis Tuesday, Thursday, and saturday who returns for follow-up of dysphagia and reflux.  Previously seen by Dr. Seals and Emi.  He continues to have a globus sensation even following recent dil.   He stopped his famotidine, but remains on pantoprazole 20mg daily.   Does report he takes a baby aspirin daily sb Prior work-up includes  11/22 EGD with dil, wnl 3/2/2022 upper GI with reflux and mild esophageal dysmotility 11/23/2020 gastric emptying study with a mild delay of 57 minutes 10/26/2020 colonoscopy colonoscopy with small hyperplastic polyps, colon spasm, and diverticulosis 9/28/2020 EGD with some gastritis and esophagitis

## 2023-10-11 NOTE — PHYSICAL EXAM CHEST:
no lesions, no deformities, no traumatic injuries, no significant scars are present, chest wall non-tender, no masses present, breathing is unlabored without accessory muscle use,normal breath sounds declines

## 2023-11-20 ENCOUNTER — OFFICE VISIT (OUTPATIENT)
Dept: URBAN - NONMETROPOLITAN AREA CLINIC 2 | Facility: CLINIC | Age: 53
End: 2023-11-20
Payer: MEDICARE

## 2023-11-20 VITALS
TEMPERATURE: 98.1 F | HEIGHT: 72 IN | DIASTOLIC BLOOD PRESSURE: 84 MMHG | HEART RATE: 97 BPM | BODY MASS INDEX: 25.48 KG/M2 | SYSTOLIC BLOOD PRESSURE: 163 MMHG | WEIGHT: 188.1 LBS

## 2023-11-20 DIAGNOSIS — R13.19 ESOPHAGEAL DYSPHAGIA: ICD-10-CM

## 2023-11-20 DIAGNOSIS — K31.84 GASTROPARESIS: ICD-10-CM

## 2023-11-20 DIAGNOSIS — K59.01 CONSTIPATION BY DELAYED COLONIC TRANSIT: ICD-10-CM

## 2023-11-20 DIAGNOSIS — N18.6 END STAGE RENAL DISEASE: ICD-10-CM

## 2023-11-20 DIAGNOSIS — K21.00 GASTROESOPHAGEAL REFLUX DISEASE WITH ESOPHAGITIS WITHOUT HEMORRHAGE: ICD-10-CM

## 2023-11-20 DIAGNOSIS — Z12.11 COLON CANCER SCREENING: ICD-10-CM

## 2023-11-20 PROBLEM — 235675006: Status: ACTIVE | Noted: 2021-03-01

## 2023-11-20 PROCEDURE — 99213 OFFICE O/P EST LOW 20 MIN: CPT

## 2023-11-20 RX ORDER — NIFEDIPINE 60 MG/1
1 TABLET TABLET, FILM COATED, EXTENDED RELEASE ORAL ONCE A DAY
Status: ACTIVE | COMMUNITY

## 2023-11-20 RX ORDER — ALPRAZOLAM 0.5 MG/1
1 TABLET TABLET ORAL TWICE A DAY
Refills: 0 | Status: ACTIVE | COMMUNITY

## 2023-11-20 RX ORDER — INSULIN ASPART 100 [IU]/ML
AS DIRECTED INJECTION, SOLUTION INTRAVENOUS; SUBCUTANEOUS DAILY
Status: ACTIVE | COMMUNITY

## 2023-11-20 RX ORDER — PANTOPRAZOLE SODIUM 40 MG/1
1 TABLET TABLET, DELAYED RELEASE ORAL ONCE A DAY
Qty: 90 TABLET | Refills: 3 | Status: ACTIVE | COMMUNITY

## 2023-11-20 RX ORDER — DULAGLUTIDE 1.5 MG/.5ML
AS DIRECTED INJECTION, SOLUTION SUBCUTANEOUS DAILY
Status: ACTIVE | COMMUNITY

## 2023-11-20 RX ORDER — INSULIN GLARGINE 100 [IU]/ML
AS DIRECTED INJECTION, SOLUTION SUBCUTANEOUS DAILY
Status: ACTIVE | COMMUNITY

## 2023-11-20 RX ORDER — FAMOTIDINE 40 MG/1
1 TABLET TABLET, FILM COATED ORAL TWICE A DAY
Qty: 60 TABLET | Refills: 0 | Status: ACTIVE | COMMUNITY

## 2023-11-20 RX ORDER — ATORVASTATIN CALCIUM 20 MG/1
1 TABLET TABLET, FILM COATED ORAL ONCE A DAY
Status: ACTIVE | COMMUNITY

## 2023-11-20 RX ORDER — CALCITRIOL 0.25 UG/1
1 CAPSULE CAPSULE ORAL ONCE A DAY
Status: ACTIVE | COMMUNITY

## 2023-11-20 RX ORDER — SUCROFERRIC OXYHYDROXIDE 500 MG/1
1 TABLET WITH MEALS TABLET, CHEWABLE ORAL TWICE A DAY
Status: ACTIVE | COMMUNITY

## 2023-11-20 RX ORDER — PANTOPRAZOLE SODIUM 20 MG/1
1 TABLET TABLET, DELAYED RELEASE ORAL ONCE A DAY
Qty: 30 TABLET | Refills: 6 | Status: ACTIVE | COMMUNITY

## 2023-11-20 RX ORDER — PANTOPRAZOLE SODIUM 40 MG/1
1 TABLET TABLET, DELAYED RELEASE ORAL ONCE A DAY
Qty: 90 TABLET | Refills: 3 | OUTPATIENT

## 2023-11-20 RX ORDER — FAMOTIDINE 40 MG/1
1 TABLET AT BEDTIME TABLET, FILM COATED ORAL ONCE A DAY
Qty: 90 TABLET | Refills: 3 | OUTPATIENT
Start: 2023-11-20

## 2023-11-20 RX ORDER — BUPROPION HYDROCHLORIDE 100 MG/1
1 TABLET TABLET ORAL TWICE A DAY
Status: ACTIVE | COMMUNITY

## 2023-11-20 NOTE — HPI-TODAY'S VISIT:
11/20/2023: Mr. Wilburn returns for follow-up of dysphagia and globus sensation.  Yasmin saw him in clinic after his EGD with dil and started pantoprazole 40 mg daily in the am. He reports he does feel improvement in his swallowing difficulties however he continues to have some trouble a few times per week with various items despite chewing well. Today we discussed adding famotidine 40 mg at bedtime and returning to discuss with Dr. Karolina MALDONADO  6/19/23: Mr. Wilburn is a 52-year-old male with a past medical history of end-stage renal disease on hemodialysis Tuesday, Thursday, and saturday who returns for follow-up of dysphagia and reflux.  Previously seen by Dr. Seals and Emi.  He continues to have a globus sensation even following recent dil.   He stopped his famotidine, but remains on pantoprazole 20mg daily.   Does report he takes a baby aspirin daily sb Prior work-up includes  11/22 EGD with dil, wnl 3/2/2022 upper GI with reflux and mild esophageal dysmotility 11/23/2020 gastric emptying study with a mild delay of 57 minutes 10/26/2020 colonoscopy colonoscopy with small hyperplastic polyps, colon spasm, and diverticulosis 9/28/2020 EGD with some gastritis and esophagitis

## 2024-02-21 ENCOUNTER — OV EP (OUTPATIENT)
Dept: URBAN - NONMETROPOLITAN AREA CLINIC 2 | Facility: CLINIC | Age: 54
End: 2024-02-21
Payer: MEDICARE

## 2024-02-21 VITALS
DIASTOLIC BLOOD PRESSURE: 63 MMHG | HEIGHT: 72 IN | BODY MASS INDEX: 25.87 KG/M2 | HEART RATE: 92 BPM | SYSTOLIC BLOOD PRESSURE: 132 MMHG | WEIGHT: 191 LBS

## 2024-02-21 DIAGNOSIS — R13.19 ESOPHAGEAL DYSPHAGIA: ICD-10-CM

## 2024-02-21 DIAGNOSIS — K21.00 GASTROESOPHAGEAL REFLUX DISEASE WITH ESOPHAGITIS WITHOUT HEMORRHAGE: ICD-10-CM

## 2024-02-21 DIAGNOSIS — K59.01 CONSTIPATION BY DELAYED COLONIC TRANSIT: ICD-10-CM

## 2024-02-21 DIAGNOSIS — N18.6 END STAGE RENAL DISEASE: ICD-10-CM

## 2024-02-21 DIAGNOSIS — Z12.11 COLON CANCER SCREENING: ICD-10-CM

## 2024-02-21 DIAGNOSIS — K31.84 GASTROPARESIS: ICD-10-CM

## 2024-02-21 PROCEDURE — 99213 OFFICE O/P EST LOW 20 MIN: CPT | Performed by: INTERNAL MEDICINE

## 2024-02-21 RX ORDER — PREDNISONE 5 MG/1
1 TABLET TABLET ORAL ONCE A DAY
Status: ACTIVE | COMMUNITY

## 2024-02-21 RX ORDER — NIFEDIPINE 60 MG/1
1 TABLET TABLET, FILM COATED, EXTENDED RELEASE ORAL ONCE A DAY
Status: ACTIVE | COMMUNITY

## 2024-02-21 RX ORDER — PANTOPRAZOLE SODIUM 20 MG/1
1 TABLET TABLET, DELAYED RELEASE ORAL ONCE A DAY
Qty: 90 TABLET | Refills: 3 | OUTPATIENT
Start: 2024-02-21

## 2024-02-21 RX ORDER — PANTOPRAZOLE SODIUM 40 MG/1
1 TABLET TABLET, DELAYED RELEASE ORAL ONCE A DAY
Qty: 90 TABLET | Refills: 3 | Status: ACTIVE | COMMUNITY

## 2024-02-21 RX ORDER — ALPRAZOLAM 0.5 MG/1
1 TABLET TABLET ORAL TWICE A DAY
Refills: 0 | Status: ACTIVE | COMMUNITY

## 2024-02-21 RX ORDER — FAMOTIDINE 40 MG/1
1 TABLET TABLET, FILM COATED ORAL TWICE A DAY
Qty: 60 TABLET | Refills: 0 | Status: ACTIVE | COMMUNITY

## 2024-02-21 RX ORDER — INSULIN GLARGINE 100 [IU]/ML
AS DIRECTED INJECTION, SOLUTION SUBCUTANEOUS DAILY
Status: ACTIVE | COMMUNITY

## 2024-02-21 RX ORDER — FAMOTIDINE 40 MG/1
1 TABLET AT BEDTIME TABLET, FILM COATED ORAL ONCE A DAY
Qty: 90 TABLET | Refills: 3 | Status: ACTIVE | COMMUNITY
Start: 2023-11-20

## 2024-02-21 RX ORDER — TACROLIMUS 1 MG/1
AS DIRECTED CAPSULE ORAL
Status: ACTIVE | COMMUNITY

## 2024-02-21 RX ORDER — BUPROPION HYDROCHLORIDE 100 MG/1
1 TABLET TABLET ORAL TWICE A DAY
Status: ACTIVE | COMMUNITY

## 2024-02-21 RX ORDER — PANTOPRAZOLE SODIUM 20 MG/1
1 TABLET TABLET, DELAYED RELEASE ORAL ONCE A DAY
Qty: 30 TABLET | Refills: 6 | Status: ACTIVE | COMMUNITY

## 2024-02-21 RX ORDER — SUCROFERRIC OXYHYDROXIDE 500 MG/1
1 TABLET WITH MEALS TABLET, CHEWABLE ORAL TWICE A DAY
Status: ACTIVE | COMMUNITY

## 2024-02-21 RX ORDER — ATORVASTATIN CALCIUM 20 MG/1
1 TABLET TABLET, FILM COATED ORAL ONCE A DAY
Status: ACTIVE | COMMUNITY

## 2024-02-21 RX ORDER — MYCOPHENOLATE MOFETIL 250 MG/1
1 CAPSULE CAPSULE ORAL TWICE A DAY
Status: ACTIVE | COMMUNITY

## 2024-02-21 RX ORDER — VALGANCICLOVIR 450 MG/1
1 TABLET WITH A MEAL TABLET, FILM COATED ORAL ONCE A DAY
Status: ACTIVE | COMMUNITY

## 2024-02-21 RX ORDER — CALCITRIOL 0.25 UG/1
1 CAPSULE CAPSULE ORAL ONCE A DAY
Status: ACTIVE | COMMUNITY

## 2024-02-21 RX ORDER — DULAGLUTIDE 1.5 MG/.5ML
AS DIRECTED INJECTION, SOLUTION SUBCUTANEOUS DAILY
Status: ACTIVE | COMMUNITY

## 2024-02-21 RX ORDER — INSULIN ASPART 100 [IU]/ML
AS DIRECTED INJECTION, SOLUTION INTRAVENOUS; SUBCUTANEOUS DAILY
Status: ACTIVE | COMMUNITY

## 2024-02-21 NOTE — HPI-TODAY'S VISIT:
2/21/24: Mr. Wilburn returns for follow-up of dysphagia and globus sensation.  He has been doing much better with use of pantoprazole 40 mg daily and famotidine at night.  He got a kidney transplant a few weeks ago and is excited about this as well.  He asks if he can wean down his acid reflux meds some.  11/20/2023: Mr. Wilburn returns for follow-up of dysphagia and globus sensation.  Yasmin saw him in clinic after his EGD with dil and started pantoprazole 40 mg daily in the am. He reports he does feel improvement in his swallowing difficulties however he continues to have some trouble a few times per week with various items despite chewing well. Today we discussed adding famotidine 40 mg at bedtime and returning to discuss with Dr. Flanagan. JOEL  6/19/23: Mr. Wilburn is a 52-year-old male with a past medical history of end-stage renal disease on hemodialysis Tuesday, Thursday, and saturday who returns for follow-up of dysphagia and reflux.  Previously seen by Dr. eSals and Emi.  He continues to have a globus sensation even following recent dil.   He stopped his famotidine, but remains on pantoprazole 20mg daily.   Does report he takes a baby aspirin daily sb Prior work-up includes  11/22 EGD with dil, wnl 3/2/2022 upper GI with reflux and mild esophageal dysmotility 11/23/2020 gastric emptying study with a mild delay of 57 minutes 10/26/2020 colonoscopy colonoscopy with small hyperplastic polyps, colon spasm, and diverticulosis 9/28/2020 EGD with some gastritis and esophagitis

## 2024-05-23 ENCOUNTER — DASHBOARD ENCOUNTERS (OUTPATIENT)
Age: 54
End: 2024-05-23

## 2024-05-23 ENCOUNTER — LAB OUTSIDE AN ENCOUNTER (OUTPATIENT)
Dept: URBAN - NONMETROPOLITAN AREA CLINIC 2 | Facility: CLINIC | Age: 54
End: 2024-05-23

## 2024-05-23 ENCOUNTER — OFFICE VISIT (OUTPATIENT)
Dept: URBAN - NONMETROPOLITAN AREA CLINIC 2 | Facility: CLINIC | Age: 54
End: 2024-05-23
Payer: MEDICARE

## 2024-05-23 VITALS — BODY MASS INDEX: 23.16 KG/M2 | TEMPERATURE: 98 F | HEIGHT: 72 IN | WEIGHT: 171 LBS

## 2024-05-23 DIAGNOSIS — K21.00 GASTROESOPHAGEAL REFLUX DISEASE WITH ESOPHAGITIS WITHOUT HEMORRHAGE: ICD-10-CM

## 2024-05-23 DIAGNOSIS — K31.84 GASTROPARESIS: ICD-10-CM

## 2024-05-23 DIAGNOSIS — Z12.11 COLON CANCER SCREENING: ICD-10-CM

## 2024-05-23 DIAGNOSIS — R13.19 ESOPHAGEAL DYSPHAGIA: ICD-10-CM

## 2024-05-23 PROCEDURE — 99214 OFFICE O/P EST MOD 30 MIN: CPT | Performed by: NURSE PRACTITIONER

## 2024-05-23 RX ORDER — MYCOPHENOLATE MOFETIL 250 MG/1
1 CAPSULE CAPSULE ORAL TWICE A DAY
Status: ACTIVE | COMMUNITY

## 2024-05-23 RX ORDER — CALCITRIOL 0.25 UG/1
1 CAPSULE CAPSULE ORAL ONCE A DAY
Status: ACTIVE | COMMUNITY

## 2024-05-23 RX ORDER — SUCROFERRIC OXYHYDROXIDE 500 MG/1
1 TABLET WITH MEALS TABLET, CHEWABLE ORAL TWICE A DAY
Status: ACTIVE | COMMUNITY

## 2024-05-23 RX ORDER — PREDNISONE 5 MG/1
1 TABLET TABLET ORAL ONCE A DAY
Status: ACTIVE | COMMUNITY

## 2024-05-23 RX ORDER — PANTOPRAZOLE SODIUM 40 MG/1
1 TABLET TABLET, DELAYED RELEASE ORAL ONCE A DAY
Qty: 90 TABLET | Refills: 3 | Status: ACTIVE | COMMUNITY

## 2024-05-23 RX ORDER — PANTOPRAZOLE SODIUM 20 MG/1
1 TABLET TABLET, DELAYED RELEASE ORAL ONCE A DAY
Qty: 30 TABLET | Refills: 6 | Status: ACTIVE | COMMUNITY

## 2024-05-23 RX ORDER — ALPRAZOLAM 0.5 MG/1
1 TABLET TABLET ORAL TWICE A DAY
Refills: 0 | Status: ACTIVE | COMMUNITY

## 2024-05-23 RX ORDER — PANTOPRAZOLE SODIUM 20 MG/1
1 TABLET TABLET, DELAYED RELEASE ORAL ONCE A DAY
Qty: 90 TABLET | Refills: 3 | Status: ACTIVE | COMMUNITY
Start: 2024-02-21

## 2024-05-23 RX ORDER — PANTOPRAZOLE SODIUM 20 MG/1
1 TABLET TABLET, DELAYED RELEASE ORAL ONCE A DAY
Qty: 90 TABLET | Refills: 3 | OUTPATIENT

## 2024-05-23 RX ORDER — DULAGLUTIDE 1.5 MG/.5ML
AS DIRECTED INJECTION, SOLUTION SUBCUTANEOUS DAILY
Status: ACTIVE | COMMUNITY

## 2024-05-23 RX ORDER — VALGANCICLOVIR 450 MG/1
1 TABLET WITH A MEAL TABLET, FILM COATED ORAL ONCE A DAY
Status: ACTIVE | COMMUNITY

## 2024-05-23 RX ORDER — FAMOTIDINE 40 MG/1
1 TABLET AT BEDTIME TABLET, FILM COATED ORAL ONCE A DAY
Qty: 90 TABLET | Refills: 3 | Status: ACTIVE | COMMUNITY
Start: 2023-11-20

## 2024-05-23 RX ORDER — BUPROPION HYDROCHLORIDE 100 MG/1
1 TABLET TABLET ORAL TWICE A DAY
Status: ACTIVE | COMMUNITY

## 2024-05-23 RX ORDER — NIFEDIPINE 60 MG/1
1 TABLET TABLET, FILM COATED, EXTENDED RELEASE ORAL ONCE A DAY
Status: ACTIVE | COMMUNITY

## 2024-05-23 RX ORDER — INSULIN ASPART 100 [IU]/ML
AS DIRECTED INJECTION, SOLUTION INTRAVENOUS; SUBCUTANEOUS DAILY
Status: ACTIVE | COMMUNITY

## 2024-05-23 RX ORDER — ATORVASTATIN CALCIUM 20 MG/1
1 TABLET TABLET, FILM COATED ORAL ONCE A DAY
Status: ACTIVE | COMMUNITY

## 2024-05-23 RX ORDER — INSULIN GLARGINE 100 [IU]/ML
AS DIRECTED INJECTION, SOLUTION SUBCUTANEOUS DAILY
Status: ACTIVE | COMMUNITY

## 2024-05-23 RX ORDER — FAMOTIDINE 40 MG/1
1 TABLET TABLET, FILM COATED ORAL TWICE A DAY
Qty: 60 TABLET | Refills: 0 | Status: ACTIVE | COMMUNITY

## 2024-05-23 RX ORDER — FAMOTIDINE 40 MG/1
1 TABLET TABLET, FILM COATED ORAL TWICE A DAY
Qty: 180 TABLET | Refills: 3 | OUTPATIENT
Start: 2024-05-23

## 2024-05-23 RX ORDER — TACROLIMUS 1 MG/1
AS DIRECTED CAPSULE ORAL
Status: ACTIVE | COMMUNITY

## 2024-07-25 ENCOUNTER — CLAIMS CREATED FROM THE CLAIM WINDOW (OUTPATIENT)
Dept: URBAN - METROPOLITAN AREA CLINIC 4 | Facility: CLINIC | Age: 54
End: 2024-07-25
Payer: MEDICARE

## 2024-07-25 ENCOUNTER — CLAIMS CREATED FROM THE CLAIM WINDOW (OUTPATIENT)
Dept: URBAN - NONMETROPOLITAN AREA SURGERY CENTER 1 | Facility: SURGERY CENTER | Age: 54
End: 2024-07-25
Payer: MEDICARE

## 2024-07-25 ENCOUNTER — TELEPHONE ENCOUNTER (OUTPATIENT)
Dept: URBAN - NONMETROPOLITAN AREA CLINIC 2 | Facility: CLINIC | Age: 54
End: 2024-07-25

## 2024-07-25 ENCOUNTER — OFFICE VISIT (OUTPATIENT)
Dept: URBAN - NONMETROPOLITAN AREA SURGERY CENTER 1 | Facility: SURGERY CENTER | Age: 54
End: 2024-07-25

## 2024-07-25 DIAGNOSIS — K21.9 ESOPHAGEAL REFLUX: ICD-10-CM

## 2024-07-25 DIAGNOSIS — K31.89 GASTRIC FOVEOLAR HYPERPLASIA: ICD-10-CM

## 2024-07-25 DIAGNOSIS — K31.89 OTHER DISEASES OF STOMACH AND DUODENUM: ICD-10-CM

## 2024-07-25 DIAGNOSIS — K21.9 GASTRO-ESOPHAGEAL REFLUX DISEASE WITHOUT ESOPHAGITIS: ICD-10-CM

## 2024-07-25 PROCEDURE — 00731 ANES UPR GI NDSC PX NOS: CPT | Performed by: NURSE ANESTHETIST, CERTIFIED REGISTERED

## 2024-07-25 PROCEDURE — 88312 SPECIAL STAINS GROUP 1: CPT | Performed by: PATHOLOGY

## 2024-07-25 PROCEDURE — 88305 TISSUE EXAM BY PATHOLOGIST: CPT | Performed by: PATHOLOGY

## 2024-07-25 RX ORDER — SUCROFERRIC OXYHYDROXIDE 500 MG/1
1 TABLET WITH MEALS TABLET, CHEWABLE ORAL TWICE A DAY
Status: ACTIVE | COMMUNITY

## 2024-07-25 RX ORDER — INSULIN GLARGINE 100 [IU]/ML
AS DIRECTED INJECTION, SOLUTION SUBCUTANEOUS DAILY
Status: ACTIVE | COMMUNITY

## 2024-07-25 RX ORDER — FLUCONAZOLE 100 MG/1
1 TABLET TABLET ORAL ONCE A DAY
Qty: 28 TABLET | Refills: 0 | OUTPATIENT
Start: 2024-07-25 | End: 2024-08-22

## 2024-07-25 RX ORDER — FAMOTIDINE 40 MG/1
1 TABLET AT BEDTIME TABLET, FILM COATED ORAL ONCE A DAY
Qty: 90 TABLET | Refills: 3 | Status: ACTIVE | COMMUNITY
Start: 2023-11-20

## 2024-07-25 RX ORDER — ALPRAZOLAM 0.5 MG/1
1 TABLET TABLET ORAL TWICE A DAY
Refills: 0 | Status: ACTIVE | COMMUNITY

## 2024-07-25 RX ORDER — MYCOPHENOLATE MOFETIL 250 MG/1
1 CAPSULE CAPSULE ORAL TWICE A DAY
Status: ACTIVE | COMMUNITY

## 2024-07-25 RX ORDER — PREDNISONE 5 MG/1
1 TABLET TABLET ORAL ONCE A DAY
Status: ACTIVE | COMMUNITY

## 2024-07-25 RX ORDER — VALGANCICLOVIR 450 MG/1
1 TABLET WITH A MEAL TABLET, FILM COATED ORAL ONCE A DAY
Status: ACTIVE | COMMUNITY

## 2024-07-25 RX ORDER — BUPROPION HYDROCHLORIDE 100 MG/1
1 TABLET TABLET ORAL TWICE A DAY
Status: ACTIVE | COMMUNITY

## 2024-07-25 RX ORDER — DULAGLUTIDE 1.5 MG/.5ML
AS DIRECTED INJECTION, SOLUTION SUBCUTANEOUS DAILY
Status: ACTIVE | COMMUNITY

## 2024-07-25 RX ORDER — TACROLIMUS 1 MG/1
AS DIRECTED CAPSULE ORAL
Status: ACTIVE | COMMUNITY

## 2024-07-25 RX ORDER — FAMOTIDINE 40 MG/1
1 TABLET TABLET, FILM COATED ORAL TWICE A DAY
Qty: 60 TABLET | Refills: 0 | Status: ACTIVE | COMMUNITY

## 2024-07-25 RX ORDER — NIFEDIPINE 60 MG/1
1 TABLET TABLET, FILM COATED, EXTENDED RELEASE ORAL ONCE A DAY
Status: ACTIVE | COMMUNITY

## 2024-07-25 RX ORDER — PANTOPRAZOLE SODIUM 20 MG/1
1 TABLET TABLET, DELAYED RELEASE ORAL ONCE A DAY
Qty: 30 TABLET | Refills: 6 | Status: ACTIVE | COMMUNITY

## 2024-07-25 RX ORDER — INSULIN ASPART 100 [IU]/ML
AS DIRECTED INJECTION, SOLUTION INTRAVENOUS; SUBCUTANEOUS DAILY
Status: ACTIVE | COMMUNITY

## 2024-07-25 RX ORDER — ATORVASTATIN CALCIUM 20 MG/1
1 TABLET TABLET, FILM COATED ORAL ONCE A DAY
Status: ACTIVE | COMMUNITY

## 2024-07-25 RX ORDER — PANTOPRAZOLE SODIUM 40 MG/1
1 TABLET TABLET, DELAYED RELEASE ORAL ONCE A DAY
Qty: 90 TABLET | Refills: 3 | Status: ACTIVE | COMMUNITY

## 2024-07-25 RX ORDER — FAMOTIDINE 40 MG/1
1 TABLET TABLET, FILM COATED ORAL TWICE A DAY
Qty: 180 TABLET | Refills: 3 | Status: ACTIVE | COMMUNITY
Start: 2024-05-23

## 2024-07-25 RX ORDER — CALCITRIOL 0.25 UG/1
1 CAPSULE CAPSULE ORAL ONCE A DAY
Status: ACTIVE | COMMUNITY

## 2024-07-25 RX ORDER — PANTOPRAZOLE SODIUM 20 MG/1
1 TABLET TABLET, DELAYED RELEASE ORAL ONCE A DAY
Qty: 90 TABLET | Refills: 3 | Status: ACTIVE | COMMUNITY

## 2024-08-09 ENCOUNTER — TELEPHONE ENCOUNTER (OUTPATIENT)
Dept: URBAN - NONMETROPOLITAN AREA CLINIC 2 | Facility: CLINIC | Age: 54
End: 2024-08-09

## 2024-08-21 ENCOUNTER — TELEPHONE ENCOUNTER (OUTPATIENT)
Dept: URBAN - NONMETROPOLITAN AREA CLINIC 2 | Facility: CLINIC | Age: 54
End: 2024-08-21

## 2024-08-21 ENCOUNTER — OFFICE VISIT (OUTPATIENT)
Dept: URBAN - NONMETROPOLITAN AREA CLINIC 2 | Facility: CLINIC | Age: 54
End: 2024-08-21
Payer: MEDICARE

## 2024-08-21 VITALS
BODY MASS INDEX: 22.08 KG/M2 | HEART RATE: 90 BPM | TEMPERATURE: 98 F | HEIGHT: 72 IN | DIASTOLIC BLOOD PRESSURE: 74 MMHG | WEIGHT: 163 LBS | SYSTOLIC BLOOD PRESSURE: 129 MMHG

## 2024-08-21 DIAGNOSIS — K31.84 GASTROPARESIS: ICD-10-CM

## 2024-08-21 DIAGNOSIS — K21.00 GASTROESOPHAGEAL REFLUX DISEASE WITH ESOPHAGITIS WITHOUT HEMORRHAGE: ICD-10-CM

## 2024-08-21 DIAGNOSIS — B37.81 CANDIDA ESOPHAGITIS: ICD-10-CM

## 2024-08-21 DIAGNOSIS — R13.19 ESOPHAGEAL DYSPHAGIA: ICD-10-CM

## 2024-08-21 DIAGNOSIS — Z12.11 COLON CANCER SCREENING: ICD-10-CM

## 2024-08-21 PROCEDURE — 99214 OFFICE O/P EST MOD 30 MIN: CPT | Performed by: INTERNAL MEDICINE

## 2024-08-21 RX ORDER — PANTOPRAZOLE SODIUM 20 MG/1
1 TABLET TABLET, DELAYED RELEASE ORAL ONCE A DAY
Qty: 90 TABLET | Refills: 3 | OUTPATIENT

## 2024-08-21 RX ORDER — BUPROPION HYDROCHLORIDE 100 MG/1
1 TABLET TABLET ORAL TWICE A DAY
Status: ACTIVE | COMMUNITY

## 2024-08-21 RX ORDER — FAMOTIDINE 40 MG/1
1 TABLET TABLET, FILM COATED ORAL TWICE A DAY
Qty: 60 TABLET | Refills: 0 | Status: ACTIVE | COMMUNITY

## 2024-08-21 RX ORDER — PREDNISONE 5 MG/1
1 TABLET TABLET ORAL ONCE A DAY
Status: ACTIVE | COMMUNITY

## 2024-08-21 RX ORDER — ATORVASTATIN CALCIUM 20 MG/1
1 TABLET TABLET, FILM COATED ORAL ONCE A DAY
Status: ACTIVE | COMMUNITY

## 2024-08-21 RX ORDER — CALCITRIOL 0.25 UG/1
1 CAPSULE CAPSULE ORAL ONCE A DAY
Status: ACTIVE | COMMUNITY

## 2024-08-21 RX ORDER — VALGANCICLOVIR 450 MG/1
1 TABLET WITH A MEAL TABLET, FILM COATED ORAL ONCE A DAY
Status: ACTIVE | COMMUNITY

## 2024-08-21 RX ORDER — SUCROFERRIC OXYHYDROXIDE 500 MG/1
1 TABLET WITH MEALS TABLET, CHEWABLE ORAL TWICE A DAY
Status: ACTIVE | COMMUNITY

## 2024-08-21 RX ORDER — ALPRAZOLAM 0.5 MG/1
1 TABLET TABLET ORAL TWICE A DAY
Refills: 0 | Status: ACTIVE | COMMUNITY

## 2024-08-21 RX ORDER — FLUCONAZOLE 100 MG/1
1 TABLET TABLET ORAL DAILY
Qty: 21 TABLET | Refills: 0 | OUTPATIENT
Start: 2024-08-22 | End: 2024-09-12

## 2024-08-21 RX ORDER — FAMOTIDINE 40 MG/1
1 TABLET TABLET, FILM COATED ORAL TWICE A DAY
Qty: 180 TABLET | Refills: 3 | OUTPATIENT

## 2024-08-21 RX ORDER — PANTOPRAZOLE SODIUM 40 MG/1
1 TABLET TABLET, DELAYED RELEASE ORAL ONCE A DAY
Qty: 90 TABLET | Refills: 3 | Status: ACTIVE | COMMUNITY

## 2024-08-21 RX ORDER — FAMOTIDINE 40 MG/1
1 TABLET AT BEDTIME TABLET, FILM COATED ORAL ONCE A DAY
Qty: 90 TABLET | Refills: 3 | Status: ACTIVE | COMMUNITY
Start: 2023-11-20

## 2024-08-21 RX ORDER — INSULIN ASPART 100 [IU]/ML
AS DIRECTED INJECTION, SOLUTION INTRAVENOUS; SUBCUTANEOUS DAILY
Status: ACTIVE | COMMUNITY

## 2024-08-21 RX ORDER — NIFEDIPINE 60 MG/1
1 TABLET TABLET, FILM COATED, EXTENDED RELEASE ORAL ONCE A DAY
Status: ACTIVE | COMMUNITY

## 2024-08-21 RX ORDER — FLUCONAZOLE 100 MG/1
1 TABLET TABLET ORAL ONCE A DAY
Qty: 28 TABLET | Refills: 0 | Status: ACTIVE | COMMUNITY
Start: 2024-07-25 | End: 2024-08-22

## 2024-08-21 RX ORDER — TACROLIMUS 1 MG/1
AS DIRECTED CAPSULE ORAL
Status: ACTIVE | COMMUNITY

## 2024-08-21 RX ORDER — PANTOPRAZOLE SODIUM 20 MG/1
1 TABLET TABLET, DELAYED RELEASE ORAL ONCE A DAY
Qty: 30 TABLET | Refills: 6 | Status: ACTIVE | COMMUNITY

## 2024-08-21 RX ORDER — FAMOTIDINE 40 MG/1
1 TABLET TABLET, FILM COATED ORAL TWICE A DAY
Qty: 180 TABLET | Refills: 3 | Status: ACTIVE | COMMUNITY
Start: 2024-05-23

## 2024-08-21 RX ORDER — INSULIN GLARGINE 100 [IU]/ML
AS DIRECTED INJECTION, SOLUTION SUBCUTANEOUS DAILY
Status: ACTIVE | COMMUNITY

## 2024-08-21 RX ORDER — PANTOPRAZOLE SODIUM 20 MG/1
1 TABLET TABLET, DELAYED RELEASE ORAL ONCE A DAY
Qty: 90 TABLET | Refills: 3 | Status: ACTIVE | COMMUNITY

## 2024-08-21 RX ORDER — MYCOPHENOLATE MOFETIL 250 MG/1
1 CAPSULE CAPSULE ORAL TWICE A DAY
Status: ACTIVE | COMMUNITY

## 2024-08-21 RX ORDER — DULAGLUTIDE 1.5 MG/.5ML
AS DIRECTED INJECTION, SOLUTION SUBCUTANEOUS DAILY
Status: ACTIVE | COMMUNITY

## 2024-08-21 NOTE — HPI-TODAY'S VISIT:
8/21/24: Mr. Wilburn returns for follow-up of dysphagia and globus sensation.  Since his last clinic visit, he had an EGD showing diffuse candida esophagitis as well as HP negative gastritis.  Empiric dilation was performed to 20 mm at the GE junction and cervical esophagus.  Biopsies did not confirm candida but he was treated with diflucan empirically as well.  Today he reports that he was unable to take diflucan as this interacts with his transplant medications.  He is having mild dysphagia and a fullness in his cervical esophagus but his weight is stable and he is able to eat solid foods.  He remains on pantoprazole daily.  5/23/24: Mr. Wilburn returns for follow-up of dysphagia and globus sensation.  He has been doing much better with use of pantoprazole 40 mg daily and famotidine at night.  cut back on ppi at last OV and now dysphagia has returned. solids. intermittently. last few weeks.  sb  Prior work-up includes  11/22 EGD with dil, wnl 3/2/2022 upper GI with reflux and mild esophageal dysmotility 11/23/2020 gastric emptying study with a mild delay of 57 minutes 10/26/2020 colonoscopy colonoscopy with small hyperplastic polyps, colon spasm, and diverticulosis 9/28/2020 EGD with some gastritis and esophagitis

## 2024-08-22 ENCOUNTER — OFFICE VISIT (OUTPATIENT)
Dept: URBAN - NONMETROPOLITAN AREA CLINIC 2 | Facility: CLINIC | Age: 54
End: 2024-08-22

## 2024-11-20 ENCOUNTER — OFFICE VISIT (OUTPATIENT)
Dept: URBAN - NONMETROPOLITAN AREA CLINIC 2 | Facility: CLINIC | Age: 54
End: 2024-11-20
Payer: MEDICARE

## 2024-11-20 ENCOUNTER — TELEPHONE ENCOUNTER (OUTPATIENT)
Dept: URBAN - NONMETROPOLITAN AREA CLINIC 2 | Facility: CLINIC | Age: 54
End: 2024-11-20

## 2024-11-20 VITALS
BODY MASS INDEX: 23.65 KG/M2 | HEIGHT: 72 IN | WEIGHT: 174.6 LBS | HEART RATE: 89 BPM | SYSTOLIC BLOOD PRESSURE: 133 MMHG | TEMPERATURE: 98 F | DIASTOLIC BLOOD PRESSURE: 74 MMHG

## 2024-11-20 DIAGNOSIS — R13.19 ESOPHAGEAL DYSPHAGIA: ICD-10-CM

## 2024-11-20 DIAGNOSIS — B37.81 CANDIDA ESOPHAGITIS: ICD-10-CM

## 2024-11-20 DIAGNOSIS — K21.00 GASTROESOPHAGEAL REFLUX DISEASE WITH ESOPHAGITIS WITHOUT HEMORRHAGE: ICD-10-CM

## 2024-11-20 DIAGNOSIS — Z12.11 COLON CANCER SCREENING: ICD-10-CM

## 2024-11-20 DIAGNOSIS — K31.84 GASTROPARESIS: ICD-10-CM

## 2024-11-20 PROCEDURE — 99214 OFFICE O/P EST MOD 30 MIN: CPT | Performed by: NURSE PRACTITIONER

## 2024-11-20 RX ORDER — MYCOPHENOLATE MOFETIL 250 MG/1
1 CAPSULE CAPSULE ORAL TWICE A DAY
Status: ACTIVE | COMMUNITY

## 2024-11-20 RX ORDER — BUPROPION HYDROCHLORIDE 100 MG/1
1 TABLET TABLET ORAL TWICE A DAY
Status: ACTIVE | COMMUNITY

## 2024-11-20 RX ORDER — VALGANCICLOVIR 450 MG/1
1 TABLET WITH A MEAL TABLET, FILM COATED ORAL ONCE A DAY
Status: ACTIVE | COMMUNITY

## 2024-11-20 RX ORDER — DULAGLUTIDE 1.5 MG/.5ML
AS DIRECTED INJECTION, SOLUTION SUBCUTANEOUS DAILY
Status: ACTIVE | COMMUNITY

## 2024-11-20 RX ORDER — ATORVASTATIN CALCIUM 20 MG/1
1 TABLET TABLET, FILM COATED ORAL ONCE A DAY
Status: ACTIVE | COMMUNITY

## 2024-11-20 RX ORDER — NIFEDIPINE 60 MG/1
1 TABLET TABLET, FILM COATED, EXTENDED RELEASE ORAL ONCE A DAY
Status: ACTIVE | COMMUNITY

## 2024-11-20 RX ORDER — INSULIN GLARGINE 100 [IU]/ML
AS DIRECTED INJECTION, SOLUTION SUBCUTANEOUS DAILY
Status: ACTIVE | COMMUNITY

## 2024-11-20 RX ORDER — PANTOPRAZOLE SODIUM 40 MG/1
1 TABLET TABLET, DELAYED RELEASE ORAL ONCE A DAY
Qty: 90 TABLET | Refills: 3 | Status: ACTIVE | COMMUNITY

## 2024-11-20 RX ORDER — PREDNISONE 5 MG/1
1 TABLET TABLET ORAL ONCE A DAY
Status: ACTIVE | COMMUNITY

## 2024-11-20 RX ORDER — FAMOTIDINE 40 MG/1
1 TABLET AT BEDTIME TABLET, FILM COATED ORAL ONCE A DAY
Qty: 90 TABLET | Refills: 3 | Status: ACTIVE | COMMUNITY
Start: 2023-11-20

## 2024-11-20 RX ORDER — SUCROFERRIC OXYHYDROXIDE 500 MG/1
1 TABLET WITH MEALS TABLET, CHEWABLE ORAL TWICE A DAY
Status: ACTIVE | COMMUNITY

## 2024-11-20 RX ORDER — INSULIN ASPART 100 [IU]/ML
AS DIRECTED INJECTION, SOLUTION INTRAVENOUS; SUBCUTANEOUS DAILY
Status: ACTIVE | COMMUNITY

## 2024-11-20 RX ORDER — TACROLIMUS 1 MG/1
AS DIRECTED CAPSULE ORAL
Status: ACTIVE | COMMUNITY

## 2024-11-20 RX ORDER — CALCITRIOL 0.25 UG/1
1 CAPSULE CAPSULE ORAL ONCE A DAY
Status: ACTIVE | COMMUNITY

## 2024-11-20 RX ORDER — PANTOPRAZOLE SODIUM 40 MG/1
1 TABLET TABLET, DELAYED RELEASE ORAL ONCE A DAY
Qty: 90 TABLET | Refills: 3 | OUTPATIENT

## 2024-11-20 RX ORDER — ALPRAZOLAM 0.5 MG/1
1 TABLET TABLET ORAL TWICE A DAY
Refills: 0 | Status: ACTIVE | COMMUNITY

## 2024-11-20 NOTE — HPI-TODAY'S VISIT:
Prior work-up includes  2024  EGD showing diffuse candida esophagitis as well as HP negative gastritis.  Empiric dilation was performed to 20 mm at the GE junction and cervical esophagus.  Biopsies did not confirm candida 11/22 EGD with dil, wnl 3/2/2022 upper GI with reflux and mild esophageal dysmotility 11/23/2020 gastric emptying study with a mild delay of 57 minutes 10/26/2020 colonoscopy colonoscopy with small hyperplastic polyps, colon spasm, and diverticulosis 9/28/2020 EGD with some gastritis and esophagitis 11/20/2024 Angel is a pleasant 54-year-old male who presents for follow-up of Candida esophagitis and dysphagia.  Dysphagia is currently under control.  He was able to complete his 21-day Diflucan course after discussing with his kidney transplant team.  He is status post transplant and on some her antirejection meds it also due for occasionally cause diarrhea.  His only GI concern today is he still feels some slight irritation in his esophagus.  He states this is not swallowing.  We discussed increasing PPI dose sb

## 2025-05-21 ENCOUNTER — OFFICE VISIT (OUTPATIENT)
Dept: URBAN - NONMETROPOLITAN AREA CLINIC 2 | Facility: CLINIC | Age: 55
End: 2025-05-21
Payer: MEDICARE

## 2025-05-21 DIAGNOSIS — K31.84 GASTROPARESIS: ICD-10-CM

## 2025-05-21 DIAGNOSIS — R13.19 ESOPHAGEAL DYSPHAGIA: ICD-10-CM

## 2025-05-21 DIAGNOSIS — Z12.11 COLON CANCER SCREENING: ICD-10-CM

## 2025-05-21 DIAGNOSIS — K21.00 GASTROESOPHAGEAL REFLUX DISEASE WITH ESOPHAGITIS WITHOUT HEMORRHAGE: ICD-10-CM

## 2025-05-21 DIAGNOSIS — B37.81 CANDIDA ESOPHAGITIS: ICD-10-CM

## 2025-05-21 PROCEDURE — 99214 OFFICE O/P EST MOD 30 MIN: CPT | Performed by: NURSE PRACTITIONER

## 2025-05-21 RX ORDER — TACROLIMUS 1 MG/1
AS DIRECTED CAPSULE ORAL
Status: ACTIVE | COMMUNITY

## 2025-05-21 RX ORDER — METOCLOPRAMIDE 5 MG/1
1 TABLET BEFORE MEALS TABLET ORAL TWICE A DAY
Qty: 60 TABLET | Refills: 5 | OUTPATIENT
Start: 2025-05-21

## 2025-05-21 RX ORDER — VALGANCICLOVIR 450 MG/1
1 TABLET WITH A MEAL TABLET, FILM COATED ORAL ONCE A DAY
Status: ACTIVE | COMMUNITY

## 2025-05-21 RX ORDER — PANCRELIPASE 60000; 12000; 38000 [USP'U]/1; [USP'U]/1; [USP'U]/1
CAPSULE, DELAYED RELEASE PELLETS ORAL
Qty: 100 CAPSULE | Status: ACTIVE | COMMUNITY

## 2025-05-21 RX ORDER — INSULIN PMP CART,AUT,G6/7,CNTR
EACH SUBCUTANEOUS
Qty: 10 EACH | Status: ACTIVE | COMMUNITY

## 2025-05-21 RX ORDER — FAMOTIDINE 40 MG/1
1 TABLET AT BEDTIME TABLET, FILM COATED ORAL ONCE A DAY
Qty: 90 TABLET | Refills: 3 | Status: ACTIVE | COMMUNITY
Start: 2023-11-20

## 2025-05-21 RX ORDER — PREDNISONE 5 MG/1
1 TABLET TABLET ORAL ONCE A DAY
Status: ACTIVE | COMMUNITY

## 2025-05-21 RX ORDER — MYCOPHENOLIC ACID 180 MG/1
TABLET, DELAYED RELEASE ORAL
Qty: 120 TABLET | Status: ACTIVE | COMMUNITY

## 2025-05-21 RX ORDER — PANTOPRAZOLE SODIUM 40 MG/1
1 TABLET TABLET, DELAYED RELEASE ORAL ONCE A DAY
Qty: 90 TABLET | Refills: 3 | Status: ACTIVE | COMMUNITY

## 2025-05-21 RX ORDER — ATORVASTATIN CALCIUM 20 MG/1
1 TABLET TABLET, FILM COATED ORAL ONCE A DAY
Status: ACTIVE | COMMUNITY

## 2025-05-21 RX ORDER — CALCITRIOL 0.25 UG/1
1 CAPSULE CAPSULE ORAL ONCE A DAY
Status: ACTIVE | COMMUNITY

## 2025-05-21 RX ORDER — INSULIN GLARGINE 100 [IU]/ML
AS DIRECTED INJECTION, SOLUTION SUBCUTANEOUS DAILY
Status: ACTIVE | COMMUNITY

## 2025-05-21 RX ORDER — BUPROPION HYDROCHLORIDE 100 MG/1
1 TABLET TABLET ORAL TWICE A DAY
Status: ACTIVE | COMMUNITY

## 2025-05-21 RX ORDER — DULAGLUTIDE 1.5 MG/.5ML
AS DIRECTED INJECTION, SOLUTION SUBCUTANEOUS DAILY
Status: ACTIVE | COMMUNITY

## 2025-05-21 RX ORDER — CARVEDILOL 25 MG/1
TABLET, FILM COATED ORAL
Qty: 180 TABLET | Status: ACTIVE | COMMUNITY

## 2025-05-21 RX ORDER — MYCOPHENOLATE MOFETIL 250 MG/1
1 CAPSULE CAPSULE ORAL TWICE A DAY
Status: ACTIVE | COMMUNITY

## 2025-05-21 RX ORDER — PANTOPRAZOLE SODIUM 40 MG/1
1 TABLET TABLET, DELAYED RELEASE ORAL TWICE DAILY
Qty: 180 | Refills: 3 | OUTPATIENT
Start: 2025-05-21

## 2025-05-21 RX ORDER — ALPRAZOLAM 0.5 MG/1
1 TABLET TABLET ORAL TWICE A DAY
Refills: 0 | Status: ACTIVE | COMMUNITY

## 2025-05-21 RX ORDER — SUCROFERRIC OXYHYDROXIDE 500 MG/1
1 TABLET WITH MEALS TABLET, CHEWABLE ORAL TWICE A DAY
Status: ACTIVE | COMMUNITY

## 2025-05-21 RX ORDER — NIFEDIPINE 60 MG/1
1 TABLET TABLET, FILM COATED, EXTENDED RELEASE ORAL ONCE A DAY
Status: ACTIVE | COMMUNITY

## 2025-05-21 RX ORDER — INSULIN ASPART 100 [IU]/ML
AS DIRECTED INJECTION, SOLUTION INTRAVENOUS; SUBCUTANEOUS DAILY
Status: ACTIVE | COMMUNITY

## 2025-05-21 NOTE — HPI-TODAY'S VISIT:
Prior work-up includes  2024  EGD showing diffuse candida esophagitis as well as HP negative gastritis.  Empiric dilation was performed to 20 mm at the GE junction and cervical esophagus.  Biopsies did not confirm candida 11/22 EGD with dil, wnl 3/2/2022 upper GI with reflux and mild esophageal dysmotility 11/23/2020 gastric emptying study with a mild delay of 57 minutes 10/26/2020 colonoscopy colonoscopy with small hyperplastic polyps, colon spasm, and diverticulosis 9/28/2020 EGD with some gastritis and esophagitis 11/20/2024 Angel is a pleasant 54-year-old male who presents for follow-up of Candida esophagitis and dysphagia.  Dysphagia is currently under control.  He was able to complete his 21-day Diflucan course after discussing with his kidney transplant team.  He is status post transplant and on some her antirejection meds it also due for occasionally cause diarrhea.  His only GI concern today is he still feels some slight irritation in his esophagus.  He states this is not swallowing.  We discussed increasing PPI dose sb 5/21/2025 Angel is a pleasant 54-year-old male who presents for follow-up of dysphagia as well as epigastric discomfort.  He feels like he is having a lot of issues with regurgitation despite PPI use.  He has a lot of postprandial bloating.  We had a lengthy discussion about his prior gastroparesis diagnosis and he is agreeable to treatment for this today.  Symptoms are very mild some days and other days severe. Sb I called but lately ordered send CT

## 2025-06-19 ENCOUNTER — OFFICE VISIT (OUTPATIENT)
Dept: URBAN - METROPOLITAN AREA TELEHEALTH 2 | Facility: TELEHEALTH | Age: 55
End: 2025-06-19

## 2025-06-19 RX ORDER — MYCOPHENOLIC ACID 180 MG/1
TABLET, DELAYED RELEASE ORAL
Qty: 120 TABLET | Status: ACTIVE | COMMUNITY

## 2025-06-19 RX ORDER — PREDNISONE 5 MG/1
1 TABLET TABLET ORAL ONCE A DAY
Status: ACTIVE | COMMUNITY

## 2025-06-19 RX ORDER — ALPRAZOLAM 0.5 MG/1
1 TABLET TABLET ORAL TWICE A DAY
Refills: 0 | Status: ACTIVE | COMMUNITY

## 2025-06-19 RX ORDER — CARVEDILOL 25 MG/1
TABLET, FILM COATED ORAL
Qty: 180 TABLET | Status: ACTIVE | COMMUNITY

## 2025-06-19 RX ORDER — SUCROFERRIC OXYHYDROXIDE 500 MG/1
1 TABLET WITH MEALS TABLET, CHEWABLE ORAL TWICE A DAY
Status: ACTIVE | COMMUNITY

## 2025-06-19 RX ORDER — PANTOPRAZOLE SODIUM 40 MG/1
1 TABLET TABLET, DELAYED RELEASE ORAL ONCE A DAY
Qty: 90 TABLET | Refills: 3 | Status: ACTIVE | COMMUNITY

## 2025-06-19 RX ORDER — MYCOPHENOLATE MOFETIL 250 MG/1
1 CAPSULE CAPSULE ORAL TWICE A DAY
Status: ACTIVE | COMMUNITY

## 2025-06-19 RX ORDER — VALGANCICLOVIR 450 MG/1
1 TABLET WITH A MEAL TABLET, FILM COATED ORAL ONCE A DAY
Status: ACTIVE | COMMUNITY

## 2025-06-19 RX ORDER — NIFEDIPINE 60 MG/1
1 TABLET TABLET, FILM COATED, EXTENDED RELEASE ORAL ONCE A DAY
Status: ACTIVE | COMMUNITY

## 2025-06-19 RX ORDER — INSULIN PMP CART,AUT,G6/7,CNTR
EACH SUBCUTANEOUS
Qty: 10 EACH | Status: ACTIVE | COMMUNITY

## 2025-06-19 RX ORDER — PANTOPRAZOLE SODIUM 40 MG/1
1 TABLET TABLET, DELAYED RELEASE ORAL TWICE DAILY
Qty: 180 | Refills: 3 | Status: ACTIVE | COMMUNITY
Start: 2025-05-21

## 2025-06-19 RX ORDER — INSULIN ASPART 100 [IU]/ML
AS DIRECTED INJECTION, SOLUTION INTRAVENOUS; SUBCUTANEOUS DAILY
Status: ACTIVE | COMMUNITY

## 2025-06-19 RX ORDER — INSULIN GLARGINE 100 [IU]/ML
AS DIRECTED INJECTION, SOLUTION SUBCUTANEOUS DAILY
Status: ACTIVE | COMMUNITY

## 2025-06-19 RX ORDER — CALCITRIOL 0.25 UG/1
1 CAPSULE CAPSULE ORAL ONCE A DAY
Status: ACTIVE | COMMUNITY

## 2025-06-19 RX ORDER — FAMOTIDINE 40 MG/1
1 TABLET AT BEDTIME TABLET, FILM COATED ORAL ONCE A DAY
Qty: 90 TABLET | Refills: 3 | Status: ACTIVE | COMMUNITY
Start: 2023-11-20

## 2025-06-19 RX ORDER — ATORVASTATIN CALCIUM 20 MG/1
1 TABLET TABLET, FILM COATED ORAL ONCE A DAY
Status: ACTIVE | COMMUNITY

## 2025-06-19 RX ORDER — DULAGLUTIDE 1.5 MG/.5ML
AS DIRECTED INJECTION, SOLUTION SUBCUTANEOUS DAILY
Status: ACTIVE | COMMUNITY

## 2025-06-19 RX ORDER — PANCRELIPASE 60000; 12000; 38000 [USP'U]/1; [USP'U]/1; [USP'U]/1
CAPSULE, DELAYED RELEASE PELLETS ORAL
Qty: 100 CAPSULE | Status: ACTIVE | COMMUNITY

## 2025-06-19 RX ORDER — METOCLOPRAMIDE 5 MG/1
1 TABLET BEFORE MEALS TABLET ORAL TWICE A DAY
Qty: 60 TABLET | Refills: 5 | Status: ACTIVE | COMMUNITY
Start: 2025-05-21

## 2025-06-19 RX ORDER — BUPROPION HYDROCHLORIDE 100 MG/1
1 TABLET TABLET ORAL TWICE A DAY
Status: ACTIVE | COMMUNITY

## 2025-06-19 RX ORDER — TACROLIMUS 1 MG/1
AS DIRECTED CAPSULE ORAL
Status: ACTIVE | COMMUNITY

## 2025-07-17 ENCOUNTER — OFFICE VISIT (OUTPATIENT)
Dept: URBAN - METROPOLITAN AREA TELEHEALTH 2 | Facility: TELEHEALTH | Age: 55
End: 2025-07-17
Payer: MEDICARE

## 2025-07-17 ENCOUNTER — LAB OUTSIDE AN ENCOUNTER (OUTPATIENT)
Dept: URBAN - METROPOLITAN AREA TELEHEALTH 2 | Facility: TELEHEALTH | Age: 55
End: 2025-07-17

## 2025-07-17 ENCOUNTER — TELEPHONE ENCOUNTER (OUTPATIENT)
Dept: URBAN - NONMETROPOLITAN AREA CLINIC 2 | Facility: CLINIC | Age: 55
End: 2025-07-17

## 2025-07-17 DIAGNOSIS — R13.19 ESOPHAGEAL DYSPHAGIA: ICD-10-CM

## 2025-07-17 DIAGNOSIS — Z12.11 COLON CANCER SCREENING: ICD-10-CM

## 2025-07-17 DIAGNOSIS — B37.81 CANDIDA ESOPHAGITIS: ICD-10-CM

## 2025-07-17 DIAGNOSIS — K21.00 GASTROESOPHAGEAL REFLUX DISEASE WITH ESOPHAGITIS WITHOUT HEMORRHAGE: ICD-10-CM

## 2025-07-17 DIAGNOSIS — K31.84 GASTROPARESIS: ICD-10-CM

## 2025-07-17 PROCEDURE — 99442 PHONE E/M BY PHYS 11-20 MIN: CPT | Performed by: NURSE PRACTITIONER

## 2025-07-17 PROCEDURE — 99212 OFFICE O/P EST SF 10 MIN: CPT | Performed by: NURSE PRACTITIONER

## 2025-07-17 RX ORDER — MYCOPHENOLIC ACID 180 MG/1
TABLET, DELAYED RELEASE ORAL
Qty: 120 TABLET | Status: ACTIVE | COMMUNITY

## 2025-07-17 RX ORDER — CALCITRIOL 0.25 UG/1
1 CAPSULE CAPSULE ORAL ONCE A DAY
Status: ACTIVE | COMMUNITY

## 2025-07-17 RX ORDER — CARVEDILOL 25 MG/1
TABLET, FILM COATED ORAL
Qty: 180 TABLET | Status: ACTIVE | COMMUNITY

## 2025-07-17 RX ORDER — PREDNISONE 5 MG/1
1 TABLET TABLET ORAL ONCE A DAY
Status: ACTIVE | COMMUNITY

## 2025-07-17 RX ORDER — SUCROFERRIC OXYHYDROXIDE 500 MG/1
1 TABLET WITH MEALS TABLET, CHEWABLE ORAL TWICE A DAY
Status: ACTIVE | COMMUNITY

## 2025-07-17 RX ORDER — NIFEDIPINE 60 MG/1
1 TABLET TABLET, FILM COATED, EXTENDED RELEASE ORAL ONCE A DAY
Status: ACTIVE | COMMUNITY

## 2025-07-17 RX ORDER — PANTOPRAZOLE SODIUM 40 MG/1
1 TABLET TABLET, DELAYED RELEASE ORAL TWICE DAILY
Qty: 180 | Refills: 3 | OUTPATIENT
Start: 2025-07-17

## 2025-07-17 RX ORDER — INSULIN GLARGINE 100 [IU]/ML
AS DIRECTED INJECTION, SOLUTION SUBCUTANEOUS DAILY
Status: ACTIVE | COMMUNITY

## 2025-07-17 RX ORDER — METOCLOPRAMIDE 5 MG/1
1 TABLET BEFORE MEALS TABLET ORAL TWICE A DAY
Qty: 60 TABLET | Refills: 5 | Status: ACTIVE | COMMUNITY
Start: 2025-05-21

## 2025-07-17 RX ORDER — FAMOTIDINE 40 MG/1
1 TABLET AT BEDTIME TABLET, FILM COATED ORAL ONCE A DAY
Qty: 90 TABLET | Refills: 3 | Status: ACTIVE | COMMUNITY
Start: 2023-11-20

## 2025-07-17 RX ORDER — MYCOPHENOLATE MOFETIL 250 MG/1
1 CAPSULE CAPSULE ORAL TWICE A DAY
Status: ACTIVE | COMMUNITY

## 2025-07-17 RX ORDER — DULAGLUTIDE 1.5 MG/.5ML
AS DIRECTED INJECTION, SOLUTION SUBCUTANEOUS DAILY
Status: ACTIVE | COMMUNITY

## 2025-07-17 RX ORDER — TACROLIMUS 1 MG/1
AS DIRECTED CAPSULE ORAL
Status: ACTIVE | COMMUNITY

## 2025-07-17 RX ORDER — VALGANCICLOVIR 450 MG/1
1 TABLET WITH A MEAL TABLET, FILM COATED ORAL ONCE A DAY
Status: ACTIVE | COMMUNITY

## 2025-07-17 RX ORDER — INSULIN ASPART 100 [IU]/ML
AS DIRECTED INJECTION, SOLUTION INTRAVENOUS; SUBCUTANEOUS DAILY
Status: ACTIVE | COMMUNITY

## 2025-07-17 RX ORDER — PANTOPRAZOLE SODIUM 40 MG/1
1 TABLET TABLET, DELAYED RELEASE ORAL TWICE DAILY
Qty: 180 | Refills: 3 | Status: ACTIVE | COMMUNITY
Start: 2025-05-21

## 2025-07-17 RX ORDER — BUPROPION HYDROCHLORIDE 100 MG/1
1 TABLET TABLET ORAL TWICE A DAY
Status: ACTIVE | COMMUNITY

## 2025-07-17 RX ORDER — ATORVASTATIN CALCIUM 20 MG/1
1 TABLET TABLET, FILM COATED ORAL ONCE A DAY
Status: ACTIVE | COMMUNITY

## 2025-07-17 RX ORDER — ALPRAZOLAM 0.5 MG/1
1 TABLET TABLET ORAL TWICE A DAY
Refills: 0 | Status: ACTIVE | COMMUNITY

## 2025-07-17 RX ORDER — PANTOPRAZOLE SODIUM 40 MG/1
1 TABLET TABLET, DELAYED RELEASE ORAL ONCE A DAY
Qty: 90 TABLET | Refills: 3 | Status: ACTIVE | COMMUNITY

## 2025-07-17 RX ORDER — PANCRELIPASE 60000; 12000; 38000 [USP'U]/1; [USP'U]/1; [USP'U]/1
CAPSULE, DELAYED RELEASE PELLETS ORAL
Qty: 100 CAPSULE | Status: ACTIVE | COMMUNITY

## 2025-07-17 RX ORDER — INSULIN PMP CART,AUT,G6/7,CNTR
EACH SUBCUTANEOUS
Qty: 10 EACH | Status: ACTIVE | COMMUNITY